# Patient Record
Sex: MALE | Race: WHITE | Employment: OTHER | URBAN - METROPOLITAN AREA
[De-identification: names, ages, dates, MRNs, and addresses within clinical notes are randomized per-mention and may not be internally consistent; named-entity substitution may affect disease eponyms.]

---

## 2020-08-19 ENCOUNTER — HOSPITAL ENCOUNTER (EMERGENCY)
Facility: MEDICAL CENTER | Age: 50
End: 2020-08-19
Attending: EMERGENCY MEDICINE
Payer: COMMERCIAL

## 2020-08-19 VITALS
HEIGHT: 70 IN | WEIGHT: 214.73 LBS | BODY MASS INDEX: 30.74 KG/M2 | TEMPERATURE: 97.2 F | HEART RATE: 88 BPM | OXYGEN SATURATION: 98 % | DIASTOLIC BLOOD PRESSURE: 88 MMHG | RESPIRATION RATE: 18 BRPM | SYSTOLIC BLOOD PRESSURE: 158 MMHG

## 2020-08-19 DIAGNOSIS — S61.213A LACERATION OF LEFT MIDDLE FINGER WITHOUT FOREIGN BODY WITHOUT DAMAGE TO NAIL, INITIAL ENCOUNTER: ICD-10-CM

## 2020-08-19 PROCEDURE — 700102 HCHG RX REV CODE 250 W/ 637 OVERRIDE(OP): Performed by: EMERGENCY MEDICINE

## 2020-08-19 PROCEDURE — 90715 TDAP VACCINE 7 YRS/> IM: CPT | Performed by: EMERGENCY MEDICINE

## 2020-08-19 PROCEDURE — 99283 EMERGENCY DEPT VISIT LOW MDM: CPT

## 2020-08-19 PROCEDURE — 700111 HCHG RX REV CODE 636 W/ 250 OVERRIDE (IP): Performed by: EMERGENCY MEDICINE

## 2020-08-19 PROCEDURE — 90471 IMMUNIZATION ADMIN: CPT

## 2020-08-19 PROCEDURE — A9270 NON-COVERED ITEM OR SERVICE: HCPCS | Performed by: EMERGENCY MEDICINE

## 2020-08-19 PROCEDURE — 700101 HCHG RX REV CODE 250: Performed by: EMERGENCY MEDICINE

## 2020-08-19 PROCEDURE — A6403 STERILE GAUZE>16 <= 48 SQ IN: HCPCS

## 2020-08-19 PROCEDURE — 304217 HCHG IRRIGATION SYSTEM

## 2020-08-19 PROCEDURE — 304999 HCHG REPAIR-SIMPLE/INTERMED LEVEL 1

## 2020-08-19 PROCEDURE — 303747 HCHG EXTRA SUTURE

## 2020-08-19 RX ORDER — HYDROCODONE BITARTRATE AND ACETAMINOPHEN 5; 325 MG/1; MG/1
1 TABLET ORAL EVERY 8 HOURS PRN
Qty: 15 TAB | Refills: 0 | Status: SHIPPED | OUTPATIENT
Start: 2020-08-19 | End: 2020-08-24

## 2020-08-19 RX ORDER — CEPHALEXIN 500 MG/1
500 CAPSULE ORAL 4 TIMES DAILY
Qty: 28 CAP | Refills: 0 | Status: SHIPPED | OUTPATIENT
Start: 2020-08-19 | End: 2021-03-09

## 2020-08-19 RX ORDER — LIDOCAINE HYDROCHLORIDE 20 MG/ML
20 INJECTION, SOLUTION INFILTRATION; PERINEURAL ONCE
Status: COMPLETED | OUTPATIENT
Start: 2020-08-19 | End: 2020-08-19

## 2020-08-19 RX ORDER — CEPHALEXIN 500 MG/1
500 CAPSULE ORAL ONCE
Status: COMPLETED | OUTPATIENT
Start: 2020-08-19 | End: 2020-08-19

## 2020-08-19 RX ADMIN — LIDOCAINE HYDROCHLORIDE 20 ML: 20 INJECTION, SOLUTION INFILTRATION; PERINEURAL at 13:04

## 2020-08-19 RX ADMIN — CLOSTRIDIUM TETANI TOXOID ANTIGEN (FORMALDEHYDE INACTIVATED), CORYNEBACTERIUM DIPHTHERIAE TOXOID ANTIGEN (FORMALDEHYDE INACTIVATED), BORDETELLA PERTUSSIS TOXOID ANTIGEN (GLUTARALDEHYDE INACTIVATED), BORDETELLA PERTUSSIS FILAMENTOUS HEMAGGLUTININ ANTIGEN (FORMALDEHYDE INACTIVATED), BORDETELLA PERTUSSIS PERTACTIN ANTIGEN, AND BORDETELLA PERTUSSIS FIMBRIAE 2/3 ANTIGEN 0.5 ML: 5; 2; 2.5; 5; 3; 5 INJECTION, SUSPENSION INTRAMUSCULAR at 13:22

## 2020-08-19 RX ADMIN — CEPHALEXIN 500 MG: 500 CAPSULE ORAL at 13:28

## 2020-08-19 ASSESSMENT — LIFESTYLE VARIABLES
DO YOU DRINK ALCOHOL: NO
DOES PATIENT WANT TO STOP DRINKING: NO

## 2020-08-19 NOTE — ED TRIAGE NOTES
"Chief Complaint   Patient presents with   • Hand Laceration     Patient cut the top of his right hand on metal. Bleeding controlled with guaze.      /100   Pulse 92   Temp 36.2 °C (97.2 °F) (Temporal)   Resp 16   Ht 1.778 m (5' 10\")   Wt 97.4 kg (214 lb 11.7 oz)   SpO2 95%   BMI 30.81 kg/m²   Pt placed back in lobby, educated on triage process, and told to inform staff of any change in condition.     "

## 2020-08-19 NOTE — ED NOTES
Understanding of DC paperwork. Medication administered per MAR. Pt's lac dressed. Bleeding controlled.

## 2020-08-20 NOTE — ED PROVIDER NOTES
ED Provider Note    CHIEF COMPLAINT   Chief Complaint   Patient presents with   • Hand Laceration     Patient cut the top of his right hand on metal. Bleeding controlled with guaze.        HPI   Jose Null is a 50 y.o. male who presents emergency room today with laceration on his right hand.  Patient states he was trying to drill into a piece of metal accidentally caught his right hand between the second and third digit causing laceration to the third digit on the top of his hand.  This occurred just prior to being seen emergency room he states he is able to move everything does not have a foreign body sensation he has some localized pain to the area no numbness or tingling.  Patient is right-hand dominant.    REVIEW OF SYSTEMS   See HPI for further details. All other systems are negative.     PAST MEDICAL HISTORY   No past medical history on file.    FAMILY HISTORY  History reviewed. No pertinent family history.    SOCIAL HISTORY  Social History     Socioeconomic History   • Marital status:      Spouse name: Not on file   • Number of children: Not on file   • Years of education: Not on file   • Highest education level: Not on file   Occupational History   • Not on file   Social Needs   • Financial resource strain: Not on file   • Food insecurity     Worry: Not on file     Inability: Not on file   • Transportation needs     Medical: Not on file     Non-medical: Not on file   Tobacco Use   • Smoking status: Never Smoker   • Smokeless tobacco: Never Used   Substance and Sexual Activity   • Alcohol use: Yes     Comment: occ   • Drug use: No   • Sexual activity: Not on file   Lifestyle   • Physical activity     Days per week: Not on file     Minutes per session: Not on file   • Stress: Not on file   Relationships   • Social connections     Talks on phone: Not on file     Gets together: Not on file     Attends Anglican service: Not on file     Active member of club or organization: Not on file      "Attends meetings of clubs or organizations: Not on file     Relationship status: Not on file   • Intimate partner violence     Fear of current or ex partner: Not on file     Emotionally abused: Not on file     Physically abused: Not on file     Forced sexual activity: Not on file   Other Topics Concern   • Not on file   Social History Narrative   • Not on file        SURGICAL HISTORY  No past surgical history on file.    CURRENT MEDICATIONS   Home Medications     Reviewed by Renuka Jones R.N. (Registered Nurse) on 08/19/20 at 1217  Med List Status: Complete   Medication Last Dose Status   erythromycin 5 MG/GM Ointment  Active   hydrocodone-acetaminophen (NORCO) 5-325 MG Tab per tablet not taking Active                ALLERGIES   No Known Allergies    PHYSICAL EXAM  VITAL SIGNS: /88   Pulse 88   Temp 36.2 °C (97.2 °F) (Temporal)   Resp 18   Ht 1.778 m (5' 10\")   Wt 97.4 kg (214 lb 11.7 oz)   SpO2 98%   BMI 30.81 kg/m²       Constitutional: Well developed, Well nourished, No acute distress, Non-toxic appearance.   Cardiovascular: Normal heart rate, Normal rhythm, No murmurs, No rubs, No gallops.   Thorax & Lungs: Normal breath sounds, No respiratory distress, No wheezing, No chest tenderness.   Skin: 5 cm laceration across the dorsal aspect of his right middle finger near the PIP joint extends to the MCP and web of the skin depth is to subcutaneous there is tendon exposure with attendant is intact through range of motion intact no foreign body noted.  Two-point discrimination distally cap refills less than 3 seconds.  Extremities: Intact distal pulses, No edema, No tenderness, No cyanosis, No clubbing.       COURSE & MEDICAL DECISION MAKING  Pertinent Labs & Imaging studies reviewed. (See chart for details) discussed signs and symptoms of infection which include but not limited to redness, swelling, discharge or fever return promptly to the emergency room.  Tetanus was made up-to-date patient was " placed on antibiotics given first dose here in the emergency room Motrin for pain also placed on Norco for breakthrough pain he understands is opiate can be addictive try alternatives for such as above Motrin no drive or use of alcohol and [place]  reviewed patient will follow-up with his primary care physician sutures out in 10 days.  He was given bacitracin to use also daily and and clean area daily.          PROCEDURE NOTE; repair of 5 cm laceration by ER physician; patient injected and locally anesthetized using Xylocaine 2% without epinephrine total amount of 3 cc.  Area was irrigated with 500 cc of sterile saline solution by ER tech.  Under sterile conditions and using sterile technique sutures applied of a 4×0 nylon tied for total of 8 interrupted.  There is good closure of the wound patient tolerated procedure well.  Bacitracin dressing applied.  Prior to closure the tendon was explored there is no foreign body and no injury to the tendon noted.    FINAL IMPRESSION  1.  Acute 5 cm laceration right hand  2.   3.      Electronically signed by: Herber Dean D.O., 8/19/2020 8:46 PM

## 2020-12-03 ENCOUNTER — HOSPITAL ENCOUNTER (OUTPATIENT)
Facility: MEDICAL CENTER | Age: 50
End: 2020-12-03
Attending: PHYSICIAN ASSISTANT

## 2020-12-03 ENCOUNTER — OFFICE VISIT (OUTPATIENT)
Dept: URGENT CARE | Facility: PHYSICIAN GROUP | Age: 50
End: 2020-12-03

## 2020-12-03 VITALS
HEIGHT: 73 IN | RESPIRATION RATE: 16 BRPM | BODY MASS INDEX: 27.04 KG/M2 | TEMPERATURE: 96.6 F | SYSTOLIC BLOOD PRESSURE: 128 MMHG | WEIGHT: 204 LBS | OXYGEN SATURATION: 98 % | HEART RATE: 80 BPM | DIASTOLIC BLOOD PRESSURE: 68 MMHG

## 2020-12-03 DIAGNOSIS — R81 GLUCOSURIA: ICD-10-CM

## 2020-12-03 DIAGNOSIS — R73.9 HYPERGLYCEMIA: ICD-10-CM

## 2020-12-03 DIAGNOSIS — R30.0 DYSURIA: ICD-10-CM

## 2020-12-03 DIAGNOSIS — E11.9 TYPE 2 DIABETES MELLITUS WITHOUT COMPLICATION, WITHOUT LONG-TERM CURRENT USE OF INSULIN (HCC): ICD-10-CM

## 2020-12-03 LAB
APPEARANCE UR: CLEAR
BILIRUB UR STRIP-MCNC: NORMAL MG/DL
COLOR UR AUTO: YELLOW
GLUCOSE BLD-MCNC: 437 MG/DL (ref 70–100)
GLUCOSE UR STRIP.AUTO-MCNC: 500 MG/DL
HBA1C MFR BLD: 12 % (ref 0–5.6)
INT CON NEG: ABNORMAL
INT CON POS: ABNORMAL
KETONES UR STRIP.AUTO-MCNC: 15 MG/DL
LEUKOCYTE ESTERASE UR QL STRIP.AUTO: NORMAL
NITRITE UR QL STRIP.AUTO: NORMAL
PH UR STRIP.AUTO: 5 [PH] (ref 5–8)
PROT UR QL STRIP: NORMAL MG/DL
RBC UR QL AUTO: NORMAL
SP GR UR STRIP.AUTO: 1.01
UROBILINOGEN UR STRIP-MCNC: 0.2 MG/DL

## 2020-12-03 PROCEDURE — 82962 GLUCOSE BLOOD TEST: CPT | Performed by: PHYSICIAN ASSISTANT

## 2020-12-03 PROCEDURE — 87077 CULTURE AEROBIC IDENTIFY: CPT

## 2020-12-03 PROCEDURE — 81002 URINALYSIS NONAUTO W/O SCOPE: CPT | Performed by: PHYSICIAN ASSISTANT

## 2020-12-03 PROCEDURE — 99203 OFFICE O/P NEW LOW 30 MIN: CPT | Performed by: PHYSICIAN ASSISTANT

## 2020-12-03 PROCEDURE — 83036 HEMOGLOBIN GLYCOSYLATED A1C: CPT | Performed by: PHYSICIAN ASSISTANT

## 2020-12-03 PROCEDURE — 87086 URINE CULTURE/COLONY COUNT: CPT

## 2020-12-03 PROCEDURE — 87186 SC STD MICRODIL/AGAR DIL: CPT

## 2020-12-03 ASSESSMENT — ENCOUNTER SYMPTOMS
LOSS OF CONSCIOUSNESS: 0
POLYDIPSIA: 1
BLURRED VISION: 0
NAUSEA: 0
DIZZINESS: 0
FEVER: 0
CHILLS: 0
FLANK PAIN: 0
HEADACHES: 0
DOUBLE VISION: 0
TINGLING: 0

## 2020-12-04 DIAGNOSIS — R30.0 DYSURIA: ICD-10-CM

## 2020-12-04 NOTE — PROGRESS NOTES
"Subjective:   Jose Null is a 50 y.o. male who presents for UTI (x1 week, frequent urination, urine is sticky and has oder. feel thirsty all the time, throat feels dry  )        This is a new problem.  Patient states that he is noticed increased urinary frequency lately and cloudy urine.  Symptoms have been present for approximately 1 week.  Additionally his throat feels dry \"thirsty all the time.\"  He is concerned about a possible UTI.  He denies flank pain, back pain, nausea, vomiting, fevers, chills, cognitive changes, malaise, fatigue.  Denies personal and family history of diabetes.  He is not established with PCP due to lack of medical insurance.  States that he has no other chronic medical problems.  No aggravating or alleviating factors.  Has never had symptoms of this nature previously.    Review of Systems   Constitutional: Negative for chills and fever.   Eyes: Negative for blurred vision and double vision.   Gastrointestinal: Negative for nausea.   Genitourinary: Positive for frequency and urgency. Negative for dysuria, flank pain and hematuria.   Neurological: Negative for dizziness, tingling, loss of consciousness and headaches.   Endo/Heme/Allergies: Positive for polydipsia.       PMH:  has a past medical history of Diabetes ([facility]).  MEDS:   Current Outpatient Medications:   •  metFORMIN (GLUCOPHAGE) 850 MG Tab, Take 1 Tab by mouth 2 times a day, with meals. Take one pill at dinner time for 7 days, then take a pill with breakfast and dinner, daily., Disp: 60 Tab, Rfl: 0  •  cephALEXin (KEFLEX) 500 MG Cap, Take 1 Cap by mouth 4 times a day. (Patient not taking: Reported on 12/3/2020), Disp: 28 Cap, Rfl: 0  •  erythromycin 5 MG/GM Ointment, Apply small amount twice a day to right eye for 3 days (Patient not taking: Reported on 12/3/2020), Disp: 1 Tube, Rfl: 0  •  hydrocodone-acetaminophen (NORCO) 5-325 MG Tab per tablet, Take 1-2 Tabs by mouth every 6 hours as needed. (Patient not taking: " "Reported on 12/3/2020), Disp: 20 Tab, Rfl: 0  ALLERGIES: No Known Allergies  SURGHX: History reviewed. No pertinent surgical history.  SOCHX:  reports that he has never smoked. He has never used smokeless tobacco. He reports current alcohol use. He reports that he does not use drugs.  FH: Family history was reviewed, no pertinent findings to report   Objective:   /68   Pulse 80   Temp 35.9 °C (96.6 °F) (Temporal)   Resp 16   Ht 1.854 m (6' 1\")   Wt 92.5 kg (204 lb)   SpO2 98%   BMI 26.91 kg/m²   Physical Exam  Vitals signs reviewed.   Constitutional:       General: He is not in acute distress.     Appearance: Normal appearance. He is well-developed. He is not toxic-appearing.   HENT:      Head: Normocephalic and atraumatic.      Right Ear: External ear normal.      Left Ear: External ear normal.      Nose: Nose normal.   Eyes:      General: Gaze aligned appropriately.   Neck:      Musculoskeletal: Neck supple.   Cardiovascular:      Rate and Rhythm: Normal rate and regular rhythm.      Heart sounds: Normal heart sounds, S1 normal and S2 normal.   Pulmonary:      Effort: Pulmonary effort is normal. No respiratory distress.      Breath sounds: Normal breath sounds. No stridor. No decreased breath sounds, wheezing, rhonchi or rales.   Skin:     General: Skin is warm and dry.      Capillary Refill: Capillary refill takes less than 2 seconds.   Neurological:      Mental Status: He is alert and oriented to person, place, and time.      Comments: CN2-12 grossly intact   Psychiatric:         Speech: Speech normal.         Behavior: Behavior normal.           Results for orders placed or performed in visit on 12/03/20   POCT Urinalysis   Result Value Ref Range    POC Color Yellow Negative    POC Appearance Clear Negative    POC Leukocyte Esterase Neg Negative    POC Nitrites Neg Negative    POC Urobiligen 0.2 Negative (0.2) mg/dL    POC Protein Neg Negative mg/dL    POC Urine PH 5.0 5.0 - 8.0    POC Blood " Trace-lysed Negative    POC Specific Gravity 1.015 <1.005 - >1.030    POC Ketones 15 Negative mg/dL    POC Bilirubin Neg Negative mg/dL    POC Glucose 500 Negative mg/dL   POCT Glucose   Result Value Ref Range    Glucose - Accu-Ck 437 (A) 70 - 100 mg/dL   POCT  A1C   Result Value Ref Range    Glycohemoglobin 12 (A) 0.0 - 5.6 %    Internal Control Negative Valid     Internal Control Positive Valid        Assessment/Plan:   1. Type 2 diabetes mellitus without complication, without long-term current use of insulin (HCC)  - metFORMIN (GLUCOPHAGE) 850 MG Tab; Take 1 Tab by mouth 2 times a day, with meals. Take one pill at dinner time for 7 days, then take a pill with breakfast and dinner, daily.  Dispense: 60 Tab; Refill: 0  - REFERRAL TO ENDOCRINOLOGY  - REFERRAL TO DIABETIC EDUCATION    2. Hyperglycemia  - metFORMIN (GLUCOPHAGE) 850 MG Tab; Take 1 Tab by mouth 2 times a day, with meals. Take one pill at dinner time for 7 days, then take a pill with breakfast and dinner, daily.  Dispense: 60 Tab; Refill: 0  - REFERRAL TO ENDOCRINOLOGY  - REFERRAL TO DIABETIC EDUCATION    3. Dysuria  - POCT Urinalysis  - Urine Culture; Future  - REFERRAL TO ENDOCRINOLOGY  - REFERRAL TO DIABETIC EDUCATION    4. Glucosuria  - POCT Glucose  - POCT  A1C  - REFERRAL TO ENDOCRINOLOGY  - REFERRAL TO DIABETIC EDUCATION    Patient has glucosuria and significantly elevated serum glucose.  He is having polydipsia and polyuria, but history, physical exam, UA do not suggest HHS.  Patient advised that he has diabetes-etiology and disease course discussed..  Patient started on Metformin.  Dietary changes and increasing physical exercises briefly discussed.  Patient urgently referred to endocrinology for further evaluation and management.  Red flag signs and symptoms discussed with patient at length.  He was instructed to go to the ER if symptoms progress or red flag symptoms develop.  He verbalized good understanding of these.  Patient also referred to  diabetes education for further management.    Differential diagnosis, natural history, supportive care, and indications for immediate follow-up discussed.

## 2020-12-04 NOTE — PATIENT INSTRUCTIONS
Diabetes, preguntas más frecuentes  (Diabetes, Frequently Asked Questions)  ¿QUÉ ES LA DIABETES?  La mayor parte de los alimentos que consumimos se transforman en glucosa (azúcar), que es utilizada por el cuerpo para generar energía. El páncreas, un órgano que se encuentra cerca del estómago, produce damaso hormona llamada insulina para facilitar el transporte de la glucosa hacia el interior de las células del organismo. Cuando se sufre de diabetes, el organismo no produce suficiente insulina o no puede utilizarla adecuadamente. [place] hace que el azúcar se acumule en la jairon.  ¿CUÁLES SON LOS SÍNTOMAS DE LA DIABETES?  · Necesidad frecuente de orinar   · Sed excesiva.   · Pérdida de peso sin causa aparente.   · Hambre excesiva.   · Visión borrosa.   · Hormigueo o adormecimiento de las alexandrea y los pies.   · Cansancio extremo la mayor parte del tiempo.   · Piel seca y que pica.   · Úlceras que tardan mucho en curarse.   · Infección por hongos.   ¿CUÁLES SON LOS TIPOS DE DIABETES?  Diabetes tipo 1  · Aproximadamente un 10% de las personas afectadas sufren jolynn tipo de diabetes.   · Suele aparecer antes de los 30 años.   · Suele ocurrir en personas con peso normal.   Diabetes tipo 2  · Aproximadamente un 90% de las personas afectadas sufren jolynn tipo de diabetes.   · Suele aparecer después de los 40 años.   · Suele ocurrir en personas con sobrepeso.   · Más probabilidades si tiene:   · Antecedentes familiares de diabetes.   · Historial de diabetes belinda el embarazo (diabetes gestacional).   · Hipertensión arterial.   · Colesterol alto y triglicéridos.   Diabetes gestacional  · Se presenta en el 4% de los embarazos.   · Por lo general desaparece damaso vez que ha nacido el bebé.   · Suele ocurrir en mujeres con:   · Antecedentes familiares de diabetes.   · Diabetes gestacional previa.   · Obesidad.   · Mayores de 25 años.   ¿QUÉ ES LA PRE-DIABETES?  Pre-diabetes significa que busby nivel de glucosa en jairon es mayor que lo  normal, tamanna no lo suficiente nilson para diagnosticar diabetes. También significa que usted está en riesgo de padecer diabetes tipo 2 y enfermedad cardíaca. Si se le diagnostica pre-diabetes, deberá controlarse la glucosa en jairon nuevamente dentro de 1 o 2 años.  ¿CÓMO SE REALIZA EL TRATAMIENTO PARA LA DIABETES?   El tratamiento está dirigido a mantener los niveles de glucosa (azúcar) de la jairon cerca de los valores normales en todo momento. En el tratamiento de la diabetes, es muy importante el entrenamiento para el autocontrol de la enfermedad. Según el tipo de diabetes que tenga, busby tratamiento incluirá rashad o más de las indicaciones siguientes :  · Control de la glucosa en jairon.   · Planificación de los alimentos.   · Práctica de ejercicios.   · Medicamentos por vía oral (píldoras) o insulina.   ¿PUEDE PREVENIRSE LA DIABETES?  En la diabetes de tipo 1, la prevención es más difícil, debido a que no se conoce cuáles pueden ser los disparadores  En la diabetes tipo 2, la prevención es más fácil, si realiza cambios en el estilo de daphney:  · Mantener un peso corporal adecuado.   · [place] edel.   · La práctica de ejercicios.   ¿EXISTE DAMASO FORD PARA LA DIABETES?  No hay ford para la diabetes. Se investiga de manera continua en búsqueda de damaso ford, y se majano realizado progresos. Nicole trastorno puede tratarse y controlarse. Las personas con diabetes pueden controlarla y llevar damaso daphney normal y activa.  ¿DEBERÍA HACERME UN CONTROL PARA LA DIABETES?  Si tiene más de 45 años, debe realizarse un control de diabetes. Deberá volver a realizarlo cada 3 años. Si tiene 45 años o más y tiene sobrepeso es muy recomendable que se realice un control con mayor frecuencia. Si tiene menos de 45 años, sobrepeso, y tiene rashad o más factores de riesgo, deberá considerar:  · Antecedentes familiares de diabetes.   · Estilo de daphney sedentario   · Hipertensión arterial.   ¿EXISTEN OTRAS CHAUDHRY DE INFORMACIÓN DE LA DIABETES?   Las siguientes  organizaciones pueden resultarle útiles en busby búsqueda de información sobre la diabetes:  National Diabetes Education Program (Programa Nacional de Educación sobre la Diabetes)  Internet: http://www.ndep.nih.gov/resources  American Association of Diabetes Educators (Asociación Norteamericana de Educadores para la Diabetes)  Internet: http://www.aadenet.org  Juvenile Diabetes Foundation International (Fundación Internacional para la Diabetes Juvenil)  Internet: http://www.jdf.org  Document Released: 12/18/2006 Document Revised: 03/11/2013  ExitCare® Patient Information ©2013 [employer].

## 2020-12-06 ENCOUNTER — TELEPHONE (OUTPATIENT)
Dept: URGENT CARE | Facility: CLINIC | Age: 50
End: 2020-12-06

## 2020-12-06 DIAGNOSIS — N30.00 ACUTE CYSTITIS WITHOUT HEMATURIA: ICD-10-CM

## 2020-12-06 LAB
BACTERIA UR CULT: ABNORMAL
BACTERIA UR CULT: ABNORMAL
SIGNIFICANT IND 70042: ABNORMAL
SITE SITE: ABNORMAL
SOURCE SOURCE: ABNORMAL

## 2020-12-06 RX ORDER — SULFAMETHOXAZOLE AND TRIMETHOPRIM 800; 160 MG/1; MG/1
1 TABLET ORAL 2 TIMES DAILY
Qty: 28 TAB | Refills: 0 | Status: SHIPPED | OUTPATIENT
Start: 2020-12-06 | End: 2020-12-20

## 2020-12-06 NOTE — TELEPHONE ENCOUNTER
Attempted to call pt with urine culture results. No answer and voicemail us not set up. Will continue to try to call pt. Medication sent to pharmacy.

## 2020-12-09 ENCOUNTER — TELEPHONE (OUTPATIENT)
Dept: MEDICAL GROUP | Facility: PHYSICIAN GROUP | Age: 50
End: 2020-12-09

## 2020-12-09 NOTE — TELEPHONE ENCOUNTER
Pt daughter-I-law called converned that referral to Endocrinology first appt is in May.  Can you refer them somewhere else or have other suggestion.  He has picked up medications.     Can You call the daughter in law who is helping to take care of him.    Thanks     Jemma

## 2021-01-04 ENCOUNTER — TELEPHONE (OUTPATIENT)
Dept: URGENT CARE | Facility: PHYSICIAN GROUP | Age: 51
End: 2021-01-04

## 2021-01-04 NOTE — TELEPHONE ENCOUNTER
Pt's daughter in law called to see if her dad can be given a new referral as the only appointment they could get from the 12/3 referral is not until May. Please call her back as soon as possible.

## 2021-01-07 NOTE — TELEPHONE ENCOUNTER
"Barney Ravi,    I attempted to contact this patient on multiple prior occasions and attempted to contact him on 1/7/21 twice. I placed the referral as \"Urgent.\"  I cannot do anything else to expedite this appointment, but I am happy to refill his metformin in the interim. Additionally, I recommend that he establish with primary ASAP, as they might also help to manage his diabetes. Please let me know if pt or family calls again. It would be helpful to get an alternate phone number, as voicemail is not set up on the pt's listed phone number.    Thank you!  JH"

## 2021-01-21 ENCOUNTER — OFFICE VISIT (OUTPATIENT)
Dept: URGENT CARE | Facility: PHYSICIAN GROUP | Age: 51
End: 2021-01-21

## 2021-01-21 VITALS
DIASTOLIC BLOOD PRESSURE: 72 MMHG | TEMPERATURE: 97.4 F | OXYGEN SATURATION: 98 % | SYSTOLIC BLOOD PRESSURE: 118 MMHG | HEIGHT: 73 IN | BODY MASS INDEX: 27.04 KG/M2 | RESPIRATION RATE: 16 BRPM | HEART RATE: 61 BPM | WEIGHT: 204 LBS

## 2021-01-21 DIAGNOSIS — R30.0 DYSURIA: ICD-10-CM

## 2021-01-21 PROCEDURE — 99213 OFFICE O/P EST LOW 20 MIN: CPT | Performed by: NURSE PRACTITIONER

## 2021-01-21 PROCEDURE — 81002 URINALYSIS NONAUTO W/O SCOPE: CPT | Performed by: NURSE PRACTITIONER

## 2021-01-21 ASSESSMENT — ENCOUNTER SYMPTOMS
CHILLS: 0
FEVER: 0

## 2021-01-22 ENCOUNTER — HOSPITAL ENCOUNTER (OUTPATIENT)
Facility: MEDICAL CENTER | Age: 51
End: 2021-01-22
Attending: NURSE PRACTITIONER

## 2021-01-22 DIAGNOSIS — R30.0 DYSURIA: ICD-10-CM

## 2021-01-22 LAB
APPEARANCE UR: CLEAR
BILIRUB UR STRIP-MCNC: NORMAL MG/DL
COLOR UR AUTO: YELLOW
GLUCOSE UR STRIP.AUTO-MCNC: NORMAL MG/DL
KETONES UR STRIP.AUTO-MCNC: NORMAL MG/DL
LEUKOCYTE ESTERASE UR QL STRIP.AUTO: NORMAL
NITRITE UR QL STRIP.AUTO: NORMAL
PH UR STRIP.AUTO: 5.5 [PH] (ref 5–8)
PROT UR QL STRIP: NORMAL MG/DL
RBC UR QL AUTO: NORMAL
SP GR UR STRIP.AUTO: 1.02
UROBILINOGEN UR STRIP-MCNC: 0.2 MG/DL

## 2021-01-22 PROCEDURE — 87491 CHLMYD TRACH DNA AMP PROBE: CPT

## 2021-01-22 PROCEDURE — 87591 N.GONORRHOEAE DNA AMP PROB: CPT

## 2021-01-22 PROCEDURE — 87186 SC STD MICRODIL/AGAR DIL: CPT

## 2021-01-22 PROCEDURE — 87077 CULTURE AEROBIC IDENTIFY: CPT

## 2021-01-22 PROCEDURE — 87086 URINE CULTURE/COLONY COUNT: CPT

## 2021-01-22 NOTE — PROGRESS NOTES
Subjective:      Jose Null is a 51 y.o. male who presents with UTI (x1 week, pt wants to check on uti and diabetes )    Past Medical History:   Diagnosis Date   • Diabetes (HCC)      Social History     Socioeconomic History   • Marital status:      Spouse name: Not on file   • Number of children: Not on file   • Years of education: Not on file   • Highest education level: Not on file   Occupational History   • Not on file   Social Needs   • Financial resource strain: Not on file   • Food insecurity     Worry: Not on file     Inability: Not on file   • Transportation needs     Medical: Not on file     Non-medical: Not on file   Tobacco Use   • Smoking status: Never Smoker   • Smokeless tobacco: Never Used   Substance and Sexual Activity   • Alcohol use: Yes     Comment: occ beer   • Drug use: No   • Sexual activity: Not on file   Lifestyle   • Physical activity     Days per week: Not on file     Minutes per session: Not on file   • Stress: Not on file   Relationships   • Social connections     Talks on phone: Not on file     Gets together: Not on file     Attends Buddhist service: Not on file     Active member of club or organization: Not on file     Attends meetings of clubs or organizations: Not on file     Relationship status: Not on file   • Intimate partner violence     Fear of current or ex partner: Not on file     Emotionally abused: Not on file     Physically abused: Not on file     Forced sexual activity: Not on file   Other Topics Concern   • Not on file   Social History Narrative   • Not on file     History reviewed. No pertinent family history.    Allergies: Patient has no known allergies.    Patient c/o mild dysuira over the last week.  States he has mild burning with urination.  No fever, aches, or chills.  No frequency or urgency.    At patient's last visit in December, he was diagnosed with type 2 diabetes and started on Metformin.  His A1c was 12% at that time and blood glucose  "was over 300.  Patient was given referral to endocrinology, but states he is not able to get in until May.  He currently does not have a primary care physician.    Patient states he feels he did not tolerate the metformin well and stopped taking it after 4 doses.  He states right now that subjectively he feels very well.  He denies excessive thirst or urination.        UTI  This is a new problem. The current episode started in the past 7 days. The problem occurs constantly. The problem has been unchanged. Pertinent negatives include no chills or fever. Nothing aggravates the symptoms. He has tried nothing for the symptoms.       Review of Systems   Constitutional: Positive for malaise/fatigue. Negative for chills and fever.   Genitourinary: Positive for frequency and urgency.   All other systems reviewed and are negative.         Objective:     /72   Pulse 61   Temp 36.3 °C (97.4 °F) (Temporal)   Resp 16   Ht 1.854 m (6' 1\")   Wt 92.5 kg (204 lb)   SpO2 98%   BMI 26.91 kg/m²      Physical Exam  Vitals signs reviewed.   Constitutional:       Appearance: Normal appearance.   Cardiovascular:      Rate and Rhythm: Normal rate and regular rhythm.      Pulses: Normal pulses.      Heart sounds: Normal heart sounds.   Pulmonary:      Effort: Pulmonary effort is normal.      Breath sounds: Normal breath sounds.   Musculoskeletal: Normal range of motion.   Skin:     General: Skin is warm and dry.   Neurological:      Mental Status: He is alert and oriented to person, place, and time.   Psychiatric:         Mood and Affect: Mood normal.         Behavior: Behavior normal.         Thought Content: Thought content normal.         Judgment: Judgment normal.       UA: negative  blood, negative leukocytes, negative nitrates, negative glucose    Point-of-care glucose: 75 mg/dL          Assessment/Plan:        1. Dysuria    Advised to keep the appointment with endocrinology  Referred to family practice and patient will " stop at the  on his way out and make an appointment  Urine culture  Urine for gonorrhea and chlamydia  Follow up in urgent care otherwise for persistent symptoms.

## 2021-01-23 LAB
C TRACH DNA SPEC QL NAA+PROBE: NEGATIVE
N GONORRHOEA DNA SPEC QL NAA+PROBE: NEGATIVE
SPECIMEN SOURCE: NORMAL

## 2021-01-24 ENCOUNTER — TELEPHONE (OUTPATIENT)
Dept: URGENT CARE | Facility: CLINIC | Age: 51
End: 2021-01-24

## 2021-01-24 DIAGNOSIS — N30.90 CYSTITIS: ICD-10-CM

## 2021-01-24 RX ORDER — NITROFURANTOIN 25; 75 MG/1; MG/1
100 CAPSULE ORAL 2 TIMES DAILY
Qty: 14 CAP | Refills: 0 | Status: SHIPPED | OUTPATIENT
Start: 2021-01-24 | End: 2021-01-31

## 2021-01-25 ENCOUNTER — TELEPHONE (OUTPATIENT)
Dept: URGENT CARE | Facility: PHYSICIAN GROUP | Age: 51
End: 2021-01-25

## 2021-01-25 NOTE — TELEPHONE ENCOUNTER
Attempted to call patient again; no answer and no voicemail set up. Patient is not registered for a [email or web address] account.  Letter sent.

## 2021-01-25 NOTE — PROGRESS NOTES
Patient's urine culture was positive for Enterococcus faecalis.  Prescription for Macrobid sent to patient's pharmacy; culture and sensitivity indicates sensitivity to Macrobid.

## 2021-03-05 ENCOUNTER — TELEPHONE (OUTPATIENT)
Dept: MEDICAL GROUP | Facility: PHYSICIAN GROUP | Age: 51
End: 2021-03-05

## 2021-03-05 NOTE — TELEPHONE ENCOUNTER
Future Appointments       Provider Department Center    3/9/2021 2:00 PM ARTUR Palencia [facility]    5/25/2021 3:00 PM Rickey Vargas M.D. [facility] - Endocrinology [facility]        NEW PATIENT VISIT PRE-VISIT PLANNING    1.  [employer] Patient is checked in Patient Demographics?Yes    2.  Immunizations were updated in Epic using Reconcile Outside Information activity? Yes         3.  Is this appointment scheduled as a Hospital Follow-Up? No    4.  Patient is due for the following Health Maintenance Topics:   Health Maintenance Due   Topic Date Due   • IMM PNEUMOCOCCAL VACCINE: 0-64 Years (1 of 1 - PPSV23) Never done   • COLONOSCOPY  Never done   • IMM ZOSTER VACCINES (1 of 2) Never done   • IMM INFLUENZA (1) Never done     5.  Reviewed/Updated the following with patient:       •   Preferred Pharmacy? Yes       •   Preferred Lab? Yes       •   Preferred Communication? Yes       •   Allergies? Yes       •   Medications? YES. Was Abstract Encounter opened and chart updated? YES       •   Social History? Yes       •   Family History (document living status of immediate family members and if + hx of  cancer, diabetes, hypertension, hyperlipidemia, heart attack, stroke) Yes    6.  Updated Care Team?       •   DME Company (gait device, O2, CPAP, etc.) NO       •   Other Specialists (eye doctor, derm, GYN, cardiology, endo, etc): N\A    7.  AHA (Puls8) form printed for Provider? N/A   Patient advised to come in 15 minutes prior to appointment

## 2021-03-09 ENCOUNTER — OFFICE VISIT (OUTPATIENT)
Dept: MEDICAL GROUP | Facility: PHYSICIAN GROUP | Age: 51
End: 2021-03-09

## 2021-03-09 VITALS
HEIGHT: 73 IN | WEIGHT: 191 LBS | DIASTOLIC BLOOD PRESSURE: 70 MMHG | BODY MASS INDEX: 25.31 KG/M2 | OXYGEN SATURATION: 98 % | HEART RATE: 82 BPM | SYSTOLIC BLOOD PRESSURE: 114 MMHG | RESPIRATION RATE: 16 BRPM | TEMPERATURE: 97.8 F

## 2021-03-09 DIAGNOSIS — Z76.89 ESTABLISHING CARE WITH NEW DOCTOR, ENCOUNTER FOR: ICD-10-CM

## 2021-03-09 DIAGNOSIS — E11.65 TYPE 2 DIABETES MELLITUS WITH HYPERGLYCEMIA, WITHOUT LONG-TERM CURRENT USE OF INSULIN (HCC): ICD-10-CM

## 2021-03-09 PROBLEM — E11.9 TYPE 2 DIABETES MELLITUS (HCC): Status: ACTIVE | Noted: 2021-03-09

## 2021-03-09 LAB
HBA1C MFR BLD: 6 % (ref 0–5.6)
INT CON NEG: ABNORMAL
INT CON POS: ABNORMAL

## 2021-03-09 PROCEDURE — 83036 HEMOGLOBIN GLYCOSYLATED A1C: CPT | Performed by: NURSE PRACTITIONER

## 2021-03-09 PROCEDURE — 99214 OFFICE O/P EST MOD 30 MIN: CPT | Performed by: NURSE PRACTITIONER

## 2021-03-09 ASSESSMENT — PATIENT HEALTH QUESTIONNAIRE - PHQ9: CLINICAL INTERPRETATION OF PHQ2 SCORE: 0
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
## 2021-03-09 NOTE — ASSESSMENT & PLAN NOTE
"New to examiner.  Patient was initially seen at [place] urgent [facility] on 12/3/20 for frequent urination, thirst, and urinary odor.  Patient was found to have hyperglycemia, glucosuria, and a urinary tract infection.  His A1c was 12%.  He was started on Metformin 850 mg twice a day and a referral to endocrinology and diabetic education was placed.  Patient returned to urgent care on 1/21/21 for burning with urination.  He had stopped taking his Metformin because he was feeling better and it was causing changes to his vision after 4 doses.  Since then the patient's wife has drastically changed his diet.  He has now started drinking daily fresh green juices, limiting beer and alcohol intake, and reducing consumption of tortillas and other carbohydrates.  His hemoglobin A1c in office today was 6%.  He reports feeling better.  He denies symptoms of polyphagia, polydipsia, or polyuria.  He has an appointment with Dr. Bonilla endocrinology on May 25, 2021.  His wife was concerned about his weight loss, however he reports that he feels well.    Vitals 12/3/2020 1/21/2021 3/9/2021   WEIGHT 204 204 191   HEIGHT 6' 1\" 6' 1\" 6' 1\"   BMI 26.91 kg/m2 26.91 kg/m2 25.2 kg/m2     "

## 2021-03-09 NOTE — LETTER
[facility]  ADA PalenciaRDEIDRE  [address]  [facility] [address]  Fax: [phone]   Authorization for Release/Disclosure of   Protected Health Information   Name: JOSE NULL : 1970 SSN: [id number]   Address: [address] [place] [address] Phone:    [phone] (home)    I authorize the entity listed below to release/disclose the PHI below to:   [facility]/ADA PalenciaRDEIDRE and ARTUR Palencia   Provider or Entity Name:  [facility]   Address   City, State, Zip   Phone:      Fax:     Reason for request: continuity of care   Information to be released:    [  ] LAST COLONOSCOPY,  including any PATH REPORT and follow-up  [  ] LAST FIT/COLOGUARD RESULT [  ] LAST DEXA  [  ] LAST MAMMOGRAM  [  ] LAST PAP  [  ] LAST LABS [  ] RETINA EXAM REPORT  [  ] IMMUNIZATION RECORDS  [X] Release all info      [  ] Check here and initial the line next to each item to release ALL health information INCLUDING  _____ Care and treatment for drug and / or alcohol abuse  _____ HIV testing, infection status, or AIDS  _____ Genetic Testing    DATES OF SERVICE OR TIME PERIOD TO BE DISCLOSED: _____________  I understand and acknowledge that:  * This Authorization may be revoked at any time by you in writing, except if your health information has already been used or disclosed.  * Your health information that will be used or disclosed as a result of you signing this authorization could be re-disclosed by the recipient. If this occurs, your re-disclosed health information may no longer be protected by State or Federal laws.  * You may refuse to sign this Authorization. Your refusal will not affect your ability to obtain treatment.  * This Authorization becomes effective upon signing and will  on (date) __________.      If no date is indicated, this Authorization will  one (1) year from the signature date.    Name: Jose Null    Signature:   Date:          3/9/2021       PLEASE FAX REQUESTED RECORDS BACK TO: [phone]

## 2021-03-09 NOTE — LETTER
[facility]  ADA PalenciaRDarylNDaryl  [address]  [facility] [address]  Fax: [phone]   Authorization for Release/Disclosure of   Protected Health Information   Name: JOSE NULL : 1970 SSN: [id number]   Address: [address] [place] [address] Phone:    [phone] (home)    I authorize the entity listed below to release/disclose the PHI below to:   [facility]/TARA Palencia.RDEIDRE and ARTUR Palencia   Provider or Entity Name:  [facility]   Address   City, State, Zip   Phone:      Fax:     Reason for request: continuity of care   Information to be released:    [  ] LAST COLONOSCOPY,  including any PATH REPORT and follow-up  [  ] LAST FIT/COLOGUARD RESULT [  ] LAST DEXA  [  ] LAST MAMMOGRAM  [  ] LAST PAP  [  ] LAST LABS [  ] RETINA EXAM REPORT  [  ] IMMUNIZATION RECORDS  [X] Release all info      [  ] Check here and initial the line next to each item to release ALL health information INCLUDING  _____ Care and treatment for drug and / or alcohol abuse  _____ HIV testing, infection status, or AIDS  _____ Genetic Testing    DATES OF SERVICE OR TIME PERIOD TO BE DISCLOSED: _____________  I understand and acknowledge that:  * This Authorization may be revoked at any time by you in writing, except if your health information has already been used or disclosed.  * Your health information that will be used or disclosed as a result of you signing this authorization could be re-disclosed by the recipient. If this occurs, your re-disclosed health information may no longer be protected by State or Federal laws.  * You may refuse to sign this Authorization. Your refusal will not affect your ability to obtain treatment.  * This Authorization becomes effective upon signing and will  on (date) __________.      If no date is indicated, this Authorization will  one (1) year from the signature date.    Name: Jose Null    Signature:   Date:          3/9/2021       PLEASE FAX REQUESTED RECORDS BACK TO: [phone]

## 2021-03-10 ENCOUNTER — HOSPITAL ENCOUNTER (OUTPATIENT)
Dept: LAB | Facility: MEDICAL CENTER | Age: 51
End: 2021-03-10
Attending: NURSE PRACTITIONER

## 2021-03-10 DIAGNOSIS — E11.65 TYPE 2 DIABETES MELLITUS WITH HYPERGLYCEMIA, WITHOUT LONG-TERM CURRENT USE OF INSULIN (HCC): ICD-10-CM

## 2021-03-10 LAB
ANION GAP SERPL CALC-SCNC: 9 MMOL/L (ref 7–16)
BUN SERPL-MCNC: 14 MG/DL (ref 8–22)
CALCIUM SERPL-MCNC: 9.6 MG/DL (ref 8.5–10.5)
CHLORIDE SERPL-SCNC: 106 MMOL/L (ref 96–112)
CHOLEST SERPL-MCNC: 204 MG/DL (ref 100–199)
CO2 SERPL-SCNC: 25 MMOL/L (ref 20–33)
CREAT SERPL-MCNC: 1.02 MG/DL (ref 0.5–1.4)
CREAT UR-MCNC: 104.93 MG/DL
FASTING STATUS PATIENT QL REPORTED: NORMAL
GLUCOSE SERPL-MCNC: 100 MG/DL (ref 65–99)
HDLC SERPL-MCNC: 56 MG/DL
LDLC SERPL CALC-MCNC: 127 MG/DL
MICROALBUMIN UR-MCNC: <1.2 MG/DL
MICROALBUMIN/CREAT UR: NORMAL MG/G (ref 0–30)
POTASSIUM SERPL-SCNC: 4.3 MMOL/L (ref 3.6–5.5)
SODIUM SERPL-SCNC: 140 MMOL/L (ref 135–145)
TRIGL SERPL-MCNC: 105 MG/DL (ref 0–149)

## 2021-03-10 PROCEDURE — 80048 BASIC METABOLIC PNL TOTAL CA: CPT

## 2021-03-10 PROCEDURE — 82570 ASSAY OF URINE CREATININE: CPT

## 2021-03-10 PROCEDURE — 80061 LIPID PANEL: CPT

## 2021-03-10 PROCEDURE — 36415 COLL VENOUS BLD VENIPUNCTURE: CPT

## 2021-03-10 PROCEDURE — 82043 UR ALBUMIN QUANTITATIVE: CPT

## 2021-03-11 NOTE — RESULT ENCOUNTER NOTE
"Please call the patient and let him know that his urine test, which checks for albumin and protein in the urine with diabetes was normal.    His BMP, eGFR which checks electrolytes, fasting blood sugar, and kidney function was stable, fasting blood sugar was only slightly elevated at 100.    His lipid profile showed slightly elevated total cholesterol at 204, normal triglycerides at 105, normal HDL (happy cholesterol) at 56, and elevated LDL (lousy cholesterol) at 127.  He can take an over-the-counter omega 3 fatty acids supplement, increase fiber in his diet, and decrease saturated fats (red meats, full fat dairy's) and trans fat (may be listed on labels as \"partially hydrogenated vegetable oil\") to help bring these values back to normal range. Additionally, losing weight, participating in aerobic exercise at least 150 minutes per week, avoidance of sugar, alcohol, and fatty/fried food will help to bring these levels back to normal. If he takes plain omega 3 fatty acids, he should take anywhere from 1-4 grams per day.    Please encourage him to keep his appointment with endocrine on 5/25/2021.    Thank you,    WOLF Palencia.  "

## 2021-04-26 ENCOUNTER — OFFICE VISIT (OUTPATIENT)
Dept: MEDICAL GROUP | Facility: PHYSICIAN GROUP | Age: 51
End: 2021-04-26

## 2021-04-26 ENCOUNTER — HOSPITAL ENCOUNTER (OUTPATIENT)
Facility: MEDICAL CENTER | Age: 51
End: 2021-04-26
Attending: NURSE PRACTITIONER

## 2021-04-26 VITALS
WEIGHT: 186 LBS | OXYGEN SATURATION: 98 % | HEIGHT: 73 IN | DIASTOLIC BLOOD PRESSURE: 86 MMHG | TEMPERATURE: 98.5 F | HEART RATE: 71 BPM | SYSTOLIC BLOOD PRESSURE: 136 MMHG | BODY MASS INDEX: 24.65 KG/M2

## 2021-04-26 DIAGNOSIS — R30.0 DYSURIA: ICD-10-CM

## 2021-04-26 LAB
APPEARANCE UR: CLEAR
BILIRUB UR STRIP-MCNC: NORMAL MG/DL
COLOR UR AUTO: YELLOW
GLUCOSE UR STRIP.AUTO-MCNC: NORMAL MG/DL
KETONES UR STRIP.AUTO-MCNC: NORMAL MG/DL
LEUKOCYTE ESTERASE UR QL STRIP.AUTO: NORMAL
NITRITE UR QL STRIP.AUTO: NORMAL
PH UR STRIP.AUTO: 6 [PH] (ref 5–8)
PROT UR QL STRIP: NORMAL MG/DL
RBC UR QL AUTO: NORMAL
SP GR UR STRIP.AUTO: 1.02
UROBILINOGEN UR STRIP-MCNC: NORMAL MG/DL

## 2021-04-26 PROCEDURE — 87086 URINE CULTURE/COLONY COUNT: CPT

## 2021-04-26 PROCEDURE — 99214 OFFICE O/P EST MOD 30 MIN: CPT | Performed by: NURSE PRACTITIONER

## 2021-04-26 PROCEDURE — 81002 URINALYSIS NONAUTO W/O SCOPE: CPT | Performed by: NURSE PRACTITIONER

## 2021-04-26 RX ORDER — PHENAZOPYRIDINE HYDROCHLORIDE 200 MG/1
200 TABLET, FILM COATED ORAL 3 TIMES DAILY PRN
Qty: 12 TABLET | Refills: 0 | Status: SHIPPED | OUTPATIENT
Start: 2021-04-26 | End: 2021-07-07
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
## 2021-04-29 LAB
BACTERIA UR CULT: NORMAL
SIGNIFICANT IND 70042: NORMAL
SITE SITE: NORMAL
SOURCE SOURCE: NORMAL

## 2021-04-29 NOTE — RESULT ENCOUNTER NOTE
Please call the patient and let him know that his urine culture did not show bacterial growth after 48 hours. I hope that his symptoms have improved with the pyridium.    Thank you,    WOLF Palencia.

## 2021-05-25 ENCOUNTER — OFFICE VISIT (OUTPATIENT)
Dept: ENDOCRINOLOGY | Facility: MEDICAL CENTER | Age: 51
End: 2021-05-25
Attending: INTERNAL MEDICINE

## 2021-05-25 VITALS
WEIGHT: 189 LBS | OXYGEN SATURATION: 98 % | DIASTOLIC BLOOD PRESSURE: 72 MMHG | HEART RATE: 91 BPM | BODY MASS INDEX: 25.05 KG/M2 | HEIGHT: 73 IN | SYSTOLIC BLOOD PRESSURE: 138 MMHG | RESPIRATION RATE: 14 BRPM

## 2021-05-25 DIAGNOSIS — E78.5 DYSLIPIDEMIA: ICD-10-CM

## 2021-05-25 DIAGNOSIS — E11.9 CONTROLLED TYPE 2 DIABETES MELLITUS WITHOUT COMPLICATION, WITHOUT LONG-TERM CURRENT USE OF INSULIN (HCC): ICD-10-CM

## 2021-05-25 DIAGNOSIS — Z79.84 LONG TERM (CURRENT) USE OF ORAL HYPOGLYCEMIC DRUGS: ICD-10-CM

## 2021-05-25 PROCEDURE — 99204 OFFICE O/P NEW MOD 45 MIN: CPT | Performed by: INTERNAL MEDICINE

## 2021-05-25 PROCEDURE — 99211 OFF/OP EST MAY X REQ PHY/QHP: CPT | Performed by: INTERNAL MEDICINE

## 2021-05-25 RX ORDER — ATORVASTATIN CALCIUM 20 MG/1
20 TABLET, FILM COATED ORAL DAILY
Qty: 90 TABLET | Refills: 3 | Status: SHIPPED | OUTPATIENT
Start: 2021-05-25 | End: 2022-05-31

## 2021-05-25 RX ORDER — LINAGLIPTIN 5 MG/1
5 TABLET, FILM COATED ORAL DAILY
Qty: 30 TABLET | Refills: 6 | Status: SHIPPED | OUTPATIENT
Start: 2021-05-25 | End: 2021-05-25

## 2021-05-25 RX ORDER — PIOGLITAZONEHYDROCHLORIDE 15 MG/1
15 TABLET ORAL DAILY
Qty: 90 TABLET | Refills: 3 | Status: SHIPPED | OUTPATIENT
Start: 2021-05-25 | End: 2021-06-24

## 2021-05-25 RX ORDER — DAPAGLIFLOZIN 10 MG/1
1 TABLET, FILM COATED ORAL DAILY
Qty: 30 TABLET | Refills: 6 | Status: SHIPPED | OUTPATIENT
Start: 2021-05-25 | End: 2021-05-25

## 2021-05-25 NOTE — PROGRESS NOTES
"Chief Complaint:  Consult requested by ARTUR Palencia for initial evaluation of Type 2 Diabetes Mellitus    HPI:   Jose Null is a 51 y.o. male with Type 2 Diabetes Mellitus diagnosed in 12/2020.  He denies hospitalizations for DKA in the past.    His a1c improved from 12% to 6.0% on 3/9/2021    He was on metformin but he stopped it because according to him it made his vision worse.  Even though I tried to explain this to him that the vision changes was from diabetes and fluctuation in his sugars and not metformin.  He is refusing metfromin at this time.    He does not monitor his blood glucose     He denies hypoglycemic episodes   He  denies hypoglycemic unawareness. He denies episodes of severe hypoglycemia requiring third party assistance.  He  is not wearing a medical alert bracelet or necklace.  He does not a glucagon emergency kit.    He denies attending diabetes education classes.  Diet: \"healthy\" diet  in general.    Diabetes Complications   He  denies history of retinopathy.  He denies laser eye surgery. Last eye exam: He is overdue for an eye exam  He denies history of peripheral sensory neuropathy.  He denies numbness, tingling in both feet.  He denies history of foot sores.   He denies history of kidney damage.  He is not on ACE inhibitor or ARB.   He denies history of coronary artery disease.  He  denies history of stroke and denies TIA.  He denies history of PAD.  He reports history of hyperlipidemia.      ROS:     CONS:     No fever, no chills, no weight loss, no fatigue   EYES:      No diplopia, no blurry vision, no redness of eyes, no swelling of eyelids   ENT:    No hearing loss, No ear pain, No sore throat, no dysphagia, no neck swelling   CV:     No chest pain, no palpitations, no claudication, no orthopnea, no PND   PULM:    No SOB, no cough, no hemoptysis, no wheezing    GI:   No nausea, no vomiting, no diarrhea, no constipation, no bloody stools   :  Passing urine " well, no dysuria, no hematuria   ENDO:   No polyuria, no polydipsia, no heat intolerance, no cold intolerance   NEURO: No headaches, no dizziness, no convulsions, no tremors   MUSC:  No joint swellings, no arthralgias, no myalgias, no weakness   SKIN:   No rash, no ulcers, no dry skin   PSYCH:   No depression, no anxiety, no difficulty sleeping       Past Medical History:  Patient Active Problem List    Diagnosis Date Noted   • Dyslipidemia 2021   • Dysuria 2021   • Controlled type 2 diabetes mellitus without complication, without long-term current use of insulin ([facility]) 2021       Past Surgical History:  History reviewed. No pertinent surgical history.     Allergies:  Patient has no known allergies.     Current Medications:    Current Outpatient Medications:   •  linagliptin (TRADJENTA) 5 MG Tab tablet, Take 1 tablet by mouth every day., Disp: 30 tablet, Rfl: 6  •  Dapagliflozin Propanediol (FARXIGA) 10 MG Tab, Take 1 tablet  by mouth every day., Disp: 30 tablet, Rfl: 6  •  atorvastatin (LIPITOR) 20 MG Tab, Take 1 tablet by mouth every day., Disp: 90 tablet, Rfl: 3  •  phenazopyridine (PYRIDIUM) 200 MG Tab, Take 1 tablet by mouth 3 times a day as needed for Moderate Pain. (Patient not taking: Reported on 2021), Disp: 12 tablet, Rfl: 0    Social History:  Social History     Socioeconomic History   • Marital status:      Spouse name: Rubia   • Number of children: 1   • Years of education: Not on file   • Highest education level: Not on file   Occupational History     Comment: Construction, painting   Tobacco Use   • Smoking status: Former Smoker     Packs/day: 0.25     Years: 3.00     Pack years: 0.75     Types: Cigarettes     Start date: 3/9/2002     Quit date: 3/9/2005     Years since quittin.2   • Smokeless tobacco: Never Used   • Tobacco comment: smoked for about 3 or 4 years few a day 5-6   Vaping Use   • Vaping Use: Never used   Substance and Sexual Activity   • Alcohol use:  "Yes     Alcohol/week: 5.4 oz     Types: 9 Shots of liquor per week     Comment: 3 shots 3 times a week   • Drug use: No   • Sexual activity: Yes     Partners: Female     Comment: wife is on contraception   Other Topics Concern   • Not on file   Social History Narrative   • Not on file     Social Determinants of Health     Financial Resource Strain:    • Difficulty of Paying Living Expenses:    Food Insecurity:    • Worried About Running Out of Food in the Last Year:    • Ran Out of Food in the Last Year:    Transportation Needs:    • Lack of Transportation (Medical):    • Lack of Transportation (Non-Medical):    Physical Activity:    • Days of Exercise per Week:    • Minutes of Exercise per Session:    Stress:    • Feeling of Stress :    Social Connections:    • Frequency of Communication with Friends and Family:    • Frequency of Social Gatherings with Friends and Family:    • Attends [employer] Services:    • Active Member of Clubs or Organizations:    • Attends Club or Organization Meetings:    • Marital Status:    Intimate Partner Violence:    • Fear of Current or Ex-Partner:    • Emotionally Abused:    • Physically Abused:    • Sexually Abused:         Family History:   Family History   Problem Relation Age of Onset   • No Known Problems Mother    • No Known Problems Father    • No Known Problems Brother    • No Known Problems Maternal Uncle    • No Known Problems Paternal Aunt    • No Known Problems Paternal Uncle    • No Known Problems Maternal Grandmother    • No Known Problems Maternal Grandfather    • No Known Problems Paternal Grandmother    • No Known Problems Paternal Grandfather    • No Known Problems Brother    • No Known Problems Brother    • No Known Problems Paternal Uncle    • No Known Problems Paternal Uncle        PHYSICAL EXAM:   Vital signs: /72 (BP Location: Right arm, Patient Position: Sitting, BP Cuff Size: Adult)   Pulse 91   Resp 14   Ht 1.854 m (6' 1\")   Wt 85.7 kg (189 lb)   " SpO2 98%   BMI 24.94 kg/m²   GENERAL: Well-developed, well-nourished  in no apparent distress.   EYE: No ocular and eyelid asymmetry, Anicteric sclerae,  PERRL, No exophthalmos or lidlag  HENT: Hearing grossly intact, Normocephalic, atraumatic. Pink, moist mucous membranes, No exudate  NECK: Supple. Trachea midline. thyroid is normal in size without nodules or tenderness  CARDIOVASCULAR: Regular rate and rhythm. No murmurs, rubs, or gallops.   LUNGS: Clear to auscultation bilaterally   ABDOMEN: Soft, nontender with positive bowel sounds.   EXTREMITIES: No clubbing, cyanosis, or edema.   NEUROLOGICAL: Cranial nerves II-XII are grossly intact   Symmetric reflexes at the patella no proximal muscle weakness, No visible tremor with both outstretched hands  LYMPH: No cervical, supraclavicular,  adenopathy palpated.   SKIN: No rashes, lesions. Turgor is normal.  FOOT: Normal sensation to monofilament testing, normal pulses, no ulcers.  Normal Vibration quantitative sensation test.    Labs:  Lab Results   Component Value Date/Time    HBA1C 6.0 (A) 03/09/2021 1432       No results found for: WBC, RBC, HEMOGLOBIN, MCV, MCH, MCHC, RDW, MPV    Lab Results   Component Value Date/Time    SODIUM 140 03/10/2021 09:24 AM    POTASSIUM 4.3 03/10/2021 09:24 AM    CHLORIDE 106 03/10/2021 09:24 AM    CO2 25 03/10/2021 09:24 AM    ANION 9.0 03/10/2021 09:24 AM    GLUCOSE 100 (H) 03/10/2021 09:24 AM    BUN 14 03/10/2021 09:24 AM    CREATININE 1.02 03/10/2021 09:24 AM    CALCIUM 9.6 03/10/2021 09:24 AM       Lab Results   Component Value Date/Time    CHOLSTRLTOT 204 (H) 03/10/2021 0924    TRIGLYCERIDE 105 03/10/2021 0924    HDL 56 03/10/2021 0924     (H) 03/10/2021 0924       Lab Results   Component Value Date/Time    MALBCRT see below 03/10/2021 09:24 AM    MICROALBUR <1.2 03/10/2021 09:24 AM        No results found for: TSHULTRASEN  No results found for: FREEDIR  No results found for: FREET3  No results found for:  THYSTIMIG      ASSESSMENT/PLAN:     1. Controlled type 2 diabetes mellitus without complication, without long-term current use of insulin (HCC)  Previously uncontrolled diabetes now controlled  Reviewed pathogenesis of type 2 diabetes and the importance of taking medications and keeping blood sugar well controlled to avoid vascular complications  Patient is glucose control is complicated by lack of insurance  Try to explain to patient that Metformin does not cause blindness and his blurry vision was most likely secondary to fluctuations in his sugars  Because he is refusing Metformin I want him to take Actos 15 mg daily for now  I am also starting him on atorvastatin  Because of lack of insurance coverage I want him to do labs at [facility]  If his next A1c is good I want him to follow-up with his primary care physician because it is expensive for him to follow-up with a specialist especially with his lack of insurance  He will follow up with our diabetes nurse in 3 months  Also discussed with patient that he should have an eye exam I am referring him to ophthalmology    2. Dyslipidemia  Unstable recommend starting atorvastatin  Repeat fasting lipids in 3 months    3. Long term (current) use of oral hypoglycemic drugs  Patient is on oral agents for type 2 diabetes management      Return in about 3 months (around 8/25/2021).       This patient during there office visit was started on new medication.  Side effects of new medications were discussed with the patient today in the office. The patient was supplied paperwork on this new medication.    Thank you kindly for allowing me to participate in the diabetes care plan for this patient.    Rickey Vargas MD, [facility]  05/25/21    CC:   ARTUR Palencia

## 2021-07-06 ENCOUNTER — TELEPHONE (OUTPATIENT)
Dept: MEDICAL GROUP | Facility: PHYSICIAN GROUP | Age: 51
End: 2021-07-06

## 2021-07-06 NOTE — TELEPHONE ENCOUNTER
Phone Number Called: [phone] (home)     Call outcome: Spoke to patient regarding message below.    Message: No Show Policy was reviewed    Jose Null No Showed a established apt on 6/29/21 with ARTUR Palencia.     YES  -1 Jose Null was reminded of appointment.     Stated he forgot apt as he is busy. Was able to reschedule for 7/7/21.

## 2021-07-07 ENCOUNTER — OFFICE VISIT (OUTPATIENT)
Dept: MEDICAL GROUP | Facility: PHYSICIAN GROUP | Age: 51
End: 2021-07-07

## 2021-07-07 VITALS
DIASTOLIC BLOOD PRESSURE: 66 MMHG | WEIGHT: 189 LBS | SYSTOLIC BLOOD PRESSURE: 108 MMHG | TEMPERATURE: 98.6 F | HEIGHT: 73 IN | HEART RATE: 69 BPM | BODY MASS INDEX: 25.05 KG/M2 | OXYGEN SATURATION: 96 %

## 2021-07-07 DIAGNOSIS — Z12.11 SCREENING FOR COLORECTAL CANCER: ICD-10-CM

## 2021-07-07 DIAGNOSIS — E11.9 CONTROLLED TYPE 2 DIABETES MELLITUS WITHOUT COMPLICATION, WITHOUT LONG-TERM CURRENT USE OF INSULIN (HCC): ICD-10-CM

## 2021-07-07 DIAGNOSIS — R30.0 DYSURIA: ICD-10-CM

## 2021-07-07 DIAGNOSIS — E78.5 DYSLIPIDEMIA: ICD-10-CM

## 2021-07-07 DIAGNOSIS — Z12.12 SCREENING FOR COLORECTAL CANCER: ICD-10-CM

## 2021-07-07 PROCEDURE — 99214 OFFICE O/P EST MOD 30 MIN: CPT | Performed by: NURSE PRACTITIONER

## 2021-07-07 PROCEDURE — 92250 FUNDUS PHOTOGRAPHY W/I&R: CPT | Mod: TC | Performed by: NURSE PRACTITIONER

## 2021-07-07 NOTE — ASSESSMENT & PLAN NOTE
New to examiner, chronic problem for the patient.  Continues atorvastatin 20 mg daily, denies side effects of medication.  Due for annual labs in March 2022.
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
## 2021-07-07 NOTE — ASSESSMENT & PLAN NOTE
Chronic, ongoing and stable.  Continues to follow with endocrinology.  Not currently on medication for this issue.  Lab orders given to patient by endocrinology.  Due for updated retinal screening.

## 2021-07-14 LAB — RETINAL SCREEN: NEGATIVE

## 2021-07-25 ENCOUNTER — HOSPITAL ENCOUNTER (EMERGENCY)
Facility: MEDICAL CENTER | Age: 51
End: 2021-07-26
Attending: EMERGENCY MEDICINE

## 2021-07-25 DIAGNOSIS — R04.0 EPISTAXIS: ICD-10-CM

## 2021-07-25 DIAGNOSIS — T14.8XXA BRUISING: ICD-10-CM

## 2021-07-25 PROCEDURE — 82077 ASSAY SPEC XCP UR&BREATH IA: CPT

## 2021-07-25 PROCEDURE — 85025 COMPLETE CBC W/AUTO DIFF WBC: CPT

## 2021-07-25 PROCEDURE — 83690 ASSAY OF LIPASE: CPT

## 2021-07-25 PROCEDURE — 80053 COMPREHEN METABOLIC PANEL: CPT

## 2021-07-25 PROCEDURE — 99283 EMERGENCY DEPT VISIT LOW MDM: CPT

## 2021-07-26 VITALS
DIASTOLIC BLOOD PRESSURE: 69 MMHG | HEIGHT: 73 IN | TEMPERATURE: 98.8 F | HEART RATE: 87 BPM | SYSTOLIC BLOOD PRESSURE: 127 MMHG | BODY MASS INDEX: 25.18 KG/M2 | OXYGEN SATURATION: 92 % | RESPIRATION RATE: 16 BRPM | WEIGHT: 190 LBS

## 2021-07-26 LAB
ALBUMIN SERPL BCP-MCNC: 4.8 G/DL (ref 3.2–4.9)
ALBUMIN/GLOB SERPL: 1.4 G/DL
ALP SERPL-CCNC: 117 U/L (ref 30–99)
ALT SERPL-CCNC: 40 U/L (ref 2–50)
ANION GAP SERPL CALC-SCNC: 17 MMOL/L (ref 7–16)
APTT PPP: 28.3 SEC (ref 24.7–36)
AST SERPL-CCNC: 42 U/L (ref 12–45)
BASOPHILS # BLD AUTO: 0.4 % (ref 0–1.8)
BASOPHILS # BLD: 0.03 K/UL (ref 0–0.12)
BILIRUB SERPL-MCNC: 0.5 MG/DL (ref 0.1–1.5)
BUN SERPL-MCNC: 14 MG/DL (ref 8–22)
CALCIUM SERPL-MCNC: 9 MG/DL (ref 8.5–10.5)
CHLORIDE SERPL-SCNC: 103 MMOL/L (ref 96–112)
CO2 SERPL-SCNC: 28 MMOL/L (ref 20–33)
CREAT SERPL-MCNC: 1.12 MG/DL (ref 0.5–1.4)
EOSINOPHIL # BLD AUTO: 0.08 K/UL (ref 0–0.51)
EOSINOPHIL NFR BLD: 1 % (ref 0–6.9)
ERYTHROCYTE [DISTWIDTH] IN BLOOD BY AUTOMATED COUNT: 48.7 FL (ref 35.9–50)
ETHANOL BLD-MCNC: 310.8 MG/DL (ref 0–10)
GLOBULIN SER CALC-MCNC: 3.5 G/DL (ref 1.9–3.5)
GLUCOSE SERPL-MCNC: 120 MG/DL (ref 65–99)
HCT VFR BLD AUTO: 54.8 % (ref 42–52)
HGB BLD-MCNC: 18.5 G/DL (ref 14–18)
IMM GRANULOCYTES # BLD AUTO: 0.02 K/UL (ref 0–0.11)
IMM GRANULOCYTES NFR BLD AUTO: 0.2 % (ref 0–0.9)
INR PPP: 1.03 (ref 0.87–1.13)
LIPASE SERPL-CCNC: 66 U/L (ref 11–82)
LYMPHOCYTES # BLD AUTO: 3.55 K/UL (ref 1–4.8)
LYMPHOCYTES NFR BLD: 43.4 % (ref 22–41)
MCH RBC QN AUTO: 31.5 PG (ref 27–33)
MCHC RBC AUTO-ENTMCNC: 33.8 G/DL (ref 33.7–35.3)
MCV RBC AUTO: 93.4 FL (ref 81.4–97.8)
MONOCYTES # BLD AUTO: 0.66 K/UL (ref 0–0.85)
MONOCYTES NFR BLD AUTO: 8.1 % (ref 0–13.4)
NEUTROPHILS # BLD AUTO: 3.84 K/UL (ref 1.82–7.42)
NEUTROPHILS NFR BLD: 46.9 % (ref 44–72)
NRBC # BLD AUTO: 0 K/UL
NRBC BLD-RTO: 0 /100 WBC
PLATELET # BLD AUTO: 185 K/UL (ref 164–446)
PMV BLD AUTO: 10.9 FL (ref 9–12.9)
POTASSIUM SERPL-SCNC: 3.9 MMOL/L (ref 3.6–5.5)
PROT SERPL-MCNC: 8.3 G/DL (ref 6–8.2)
PROTHROMBIN TIME: 13.2 SEC (ref 12–14.6)
RBC # BLD AUTO: 5.87 M/UL (ref 4.7–6.1)
SODIUM SERPL-SCNC: 148 MMOL/L (ref 135–145)
WBC # BLD AUTO: 8.2 K/UL (ref 4.8–10.8)

## 2021-07-26 PROCEDURE — 85610 PROTHROMBIN TIME: CPT

## 2021-07-26 PROCEDURE — 85730 THROMBOPLASTIN TIME PARTIAL: CPT

## 2021-07-26 NOTE — ED TRIAGE NOTES
[facility]    Chief Complaint   Patient presents with   • Epistaxis     Pts family brought him in tonight for a nose bleed that started approx 2 hours ago. Bleeding controlled at this time.        PT ambulatory to triage with steady gait. PT roomed immediatly.
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
## 2021-07-26 NOTE — ED NOTES
Pt intoxicated so discharge instructions provided to patient's son and daughter in law who verbalized understanding of follow up appointments, contusions, and nosebleeds. Discharged from unit

## 2021-08-24 ENCOUNTER — NON-PROVIDER VISIT (OUTPATIENT)
Dept: ENDOCRINOLOGY | Facility: MEDICAL CENTER | Age: 51
End: 2021-08-24
Attending: INTERNAL MEDICINE

## 2021-08-24 VITALS — OXYGEN SATURATION: 97 % | HEIGHT: 73 IN | WEIGHT: 190 LBS | BODY MASS INDEX: 25.18 KG/M2

## 2021-08-24 DIAGNOSIS — E11.9 CONTROLLED TYPE 2 DIABETES MELLITUS WITHOUT COMPLICATION, WITHOUT LONG-TERM CURRENT USE OF INSULIN (HCC): ICD-10-CM

## 2021-08-24 LAB
HBA1C MFR BLD: 5.6 % (ref 0–5.6)
INT CON NEG: NORMAL
INT CON POS: NORMAL

## 2021-08-24 PROCEDURE — 99211 OFF/OP EST MAY X REQ PHY/QHP: CPT | Performed by: INTERNAL MEDICINE

## 2021-08-24 PROCEDURE — 83036 HEMOGLOBIN GLYCOSYLATED A1C: CPT

## 2021-08-24 ASSESSMENT — FIBROSIS 4 INDEX: FIB4 SCORE: 1.83

## 2021-08-24 NOTE — PROGRESS NOTES
RN-CDE Note    Subjective:   Endocrinology Clinic Progress Note  PCP: ARTUR Palencia    HPI:  Jose Null is a 51 y.o. old patient who is seen today for review of Type 2 Diabetes.  Recent changes in health: general health good  DM:   Last A1c:   Lab Results   Component Value Date/Time    HBA1C 5.6 08/24/2021 03:32 PM      Previous A1c was 6.0 on 3/9/21  A1C GOAL: < 7    Diabetes Medications:   No diabetes medications    Exercise: Walking and lifting weights.  Painting and remodeling work  Diet: protein, beans, eggs, and starch.  Patient's body mass index is 25.07 kg/m². Exercise and nutrition counseling were performed at this visit.    Glucose monitoring frequency: Not testing blood sugars    Hypoglycemic episodes: no  Last Retinal Exam: on file and up-to-date  Daily Foot Exam: Yes   Foot Exam:  Monofilament: done  Monofilament testing with a 10 gram force: sensation intact: intact bilaterally  Visual Inspection: Feet without maceration, ulcers, fissures.  Pedal pulses: intact bilaterally   Lab Results   Component Value Date/Time    MALBCRT see below 03/10/2021 09:24 AM    MICROALBUR <1.2 03/10/2021 09:24 AM        He  reports that he quit smoking about 16 years ago. His smoking use included cigarettes. He started smoking about 19 years ago. He has a 0.75 pack-year smoking history. He has never used smokeless tobacco.      Plan:     Discussed and educated on:   - All medications, side effects and compliance (discussed carefully)  - Annual eye examinations at Ophthalmology  - Home glucose monitoring emphasized  - Weight control and daily exercise    Recommended medication changes: Stopped drinking sugar and energy drinks. Follow up in 1 year.

## 2021-10-25 ENCOUNTER — HOSPITAL ENCOUNTER (OUTPATIENT)
Facility: MEDICAL CENTER | Age: 51
End: 2021-10-25
Attending: PHYSICIAN ASSISTANT

## 2021-10-25 ENCOUNTER — OFFICE VISIT (OUTPATIENT)
Dept: URGENT CARE | Facility: PHYSICIAN GROUP | Age: 51
End: 2021-10-25

## 2021-10-25 VITALS
OXYGEN SATURATION: 97 % | HEIGHT: 73 IN | BODY MASS INDEX: 25.18 KG/M2 | SYSTOLIC BLOOD PRESSURE: 146 MMHG | WEIGHT: 190 LBS | TEMPERATURE: 97.9 F | HEART RATE: 95 BPM | RESPIRATION RATE: 16 BRPM | DIASTOLIC BLOOD PRESSURE: 86 MMHG

## 2021-10-25 DIAGNOSIS — R30.0 DYSURIA: ICD-10-CM

## 2021-10-25 DIAGNOSIS — R39.89 SUSPECTED UTI: ICD-10-CM

## 2021-10-25 PROCEDURE — 99213 OFFICE O/P EST LOW 20 MIN: CPT | Performed by: PHYSICIAN ASSISTANT

## 2021-10-25 PROCEDURE — 87086 URINE CULTURE/COLONY COUNT: CPT

## 2021-10-25 RX ORDER — NITROFURANTOIN 25; 75 MG/1; MG/1
100 CAPSULE ORAL 2 TIMES DAILY
Qty: 14 CAPSULE | Refills: 0 | Status: SHIPPED | OUTPATIENT
Start: 2021-10-25 | End: 2021-11-01

## 2021-10-25 ASSESSMENT — ENCOUNTER SYMPTOMS
NAUSEA: 0
FLANK PAIN: 0
ABDOMINAL PAIN: 0

## 2021-10-25 ASSESSMENT — FIBROSIS 4 INDEX: FIB4 SCORE: 1.83
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
## 2021-10-26 DIAGNOSIS — R39.89 SUSPECTED UTI: ICD-10-CM

## 2021-10-26 DIAGNOSIS — R30.0 DYSURIA: ICD-10-CM

## 2021-10-28 LAB
BACTERIA UR CULT: NORMAL
SIGNIFICANT IND 70042: NORMAL
SITE SITE: NORMAL
SOURCE SOURCE: NORMAL

## 2021-12-29 ENCOUNTER — OFFICE VISIT (OUTPATIENT)
Dept: URGENT CARE | Facility: PHYSICIAN GROUP | Age: 51
End: 2021-12-29

## 2021-12-29 ENCOUNTER — HOSPITAL ENCOUNTER (OUTPATIENT)
Facility: MEDICAL CENTER | Age: 51
End: 2021-12-29
Attending: PHYSICIAN ASSISTANT

## 2021-12-29 VITALS
BODY MASS INDEX: 25.18 KG/M2 | DIASTOLIC BLOOD PRESSURE: 80 MMHG | HEART RATE: 84 BPM | TEMPERATURE: 98.7 F | HEIGHT: 73 IN | RESPIRATION RATE: 14 BRPM | WEIGHT: 190 LBS | OXYGEN SATURATION: 97 % | SYSTOLIC BLOOD PRESSURE: 128 MMHG

## 2021-12-29 DIAGNOSIS — R30.0 DYSURIA: ICD-10-CM

## 2021-12-29 LAB
APPEARANCE UR: CLEAR
BILIRUB UR STRIP-MCNC: NEGATIVE MG/DL
C TRACH DNA SPEC QL NAA+PROBE: NEGATIVE
COLOR UR AUTO: NORMAL
GLUCOSE UR STRIP.AUTO-MCNC: NEGATIVE MG/DL
KETONES UR STRIP.AUTO-MCNC: NEGATIVE MG/DL
LEUKOCYTE ESTERASE UR QL STRIP.AUTO: NEGATIVE
N GONORRHOEA DNA SPEC QL NAA+PROBE: NEGATIVE
NITRITE UR QL STRIP.AUTO: NEGATIVE
PH UR STRIP.AUTO: 5.5 [PH] (ref 5–8)
PROT UR QL STRIP: NEGATIVE MG/DL
RBC UR QL AUTO: NORMAL
SP GR UR STRIP.AUTO: 1.02
SPECIMEN SOURCE: NORMAL
UROBILINOGEN UR STRIP-MCNC: 0.2 MG/DL

## 2021-12-29 PROCEDURE — 87491 CHLMYD TRACH DNA AMP PROBE: CPT

## 2021-12-29 PROCEDURE — 87086 URINE CULTURE/COLONY COUNT: CPT

## 2021-12-29 PROCEDURE — 99213 OFFICE O/P EST LOW 20 MIN: CPT | Performed by: PHYSICIAN ASSISTANT

## 2021-12-29 PROCEDURE — 87591 N.GONORRHOEAE DNA AMP PROB: CPT

## 2021-12-29 PROCEDURE — 81002 URINALYSIS NONAUTO W/O SCOPE: CPT | Performed by: PHYSICIAN ASSISTANT

## 2021-12-29 ASSESSMENT — FIBROSIS 4 INDEX: FIB4 SCORE: 1.83

## 2021-12-29 NOTE — PROGRESS NOTES
Subjective     Jose Null is a 51 y.o. male who presents with Dysuria (x3days )    Medications:    • atorvastatin Tabs    Allergies: Patient has no known allergies.    Problem List: Jose Null does not have any pertinent problems on file.    Surgical History:  No past surgical history on file.    Past Social Hx: Jose Null  reports that he quit smoking about 16 years ago. His smoking use included cigarettes. He started smoking about 19 years ago. He has a 0.75 pack-year smoking history. He has never used smokeless tobacco. He reports current alcohol use of about 5.4 oz of alcohol per week. He reports that he does not use drugs.     Past Family Hx:  Jose Null family history includes No Known Problems in his brother, brother, brother, father, maternal grandfather, maternal grandmother, maternal uncle, mother, paternal aunt, paternal grandfather, paternal grandmother, paternal uncle, paternal uncle, and paternal uncle.     Problem list, medications, and allergies reviewed by myself today in Epic.          Patient presents with:  Dysuria: x3days , pt has history of UTI in past, though not frequently.  Pt denies new partner. Denies fever, chills, rash, lesions.     Dysuria   This is a new problem. Episode onset: 3 days. The problem occurs intermittently. The problem has been gradually worsening. The quality of the pain is described as burning. The pain is mild. There has been no fever. He is sexually active. There is no history of pyelonephritis. Associated symptoms include frequency. Pertinent negatives include no chills, discharge, flank pain, hematuria or urgency.       Review of Systems   Constitutional: Negative for chills and fever.   Genitourinary: Positive for dysuria and frequency. Negative for flank pain, hematuria and urgency.   Skin: Negative for rash.   All other systems reviewed and are negative.             Objective     /80   Pulse 84   Temp  "37.1 °C (98.7 °F) (Temporal)   Resp 14   Ht 1.854 m (6' 1\")   Wt 86.2 kg (190 lb)   SpO2 97%   BMI 25.07 kg/m²      Physical Exam  Vitals and nursing note reviewed.   Constitutional:       General: He is not in acute distress.     Appearance: Normal appearance. He is well-developed and normal weight. He is not toxic-appearing.   HENT:      Head: Normocephalic and atraumatic.      Nose: Nose normal.      Mouth/Throat:      Mouth: Mucous membranes are moist.   Eyes:      Extraocular Movements: Extraocular movements intact.      Conjunctiva/sclera: Conjunctivae normal.      Pupils: Pupils are equal, round, and reactive to light.   Cardiovascular:      Rate and Rhythm: Normal rate and regular rhythm.      Pulses: Normal pulses.      Heart sounds: Normal heart sounds.   Pulmonary:      Effort: Pulmonary effort is normal.      Breath sounds: Normal breath sounds.   Abdominal:      General: Bowel sounds are normal.      Palpations: Abdomen is soft.      Tenderness: There is no guarding or rebound.   Musculoskeletal:         General: Normal range of motion.      Cervical back: Normal range of motion and neck supple.   Skin:     General: Skin is warm and dry.      Capillary Refill: Capillary refill takes less than 2 seconds.   Neurological:      General: No focal deficit present.      Mental Status: He is alert and oriented to person, place, and time.      Gait: Gait normal.   Psychiatric:         Mood and Affect: Mood normal.         Behavior: Behavior is cooperative.                             Assessment & Plan             1. Dysuria  POCT Urinalysis    Chlamydia/GC PCR Urine Or Swab    URINE CULTURE(NEW)     Patient was evaluated in clinic today while wearing appropriate personal protective equipment.      Culture sent to lab, will call with any necessary treatment or treatment changes.  We will wait to prescribe any medications until culture results are known.  Pt agrees with this plan.     Increase fluids as " discussed.     PT should follow up with PCP in 1-2 days for re-evaluation if symptoms have not improved.      Discussed red flags and reasons to return to UC or ED.      Pt and/or family verbalized understanding of diagnosis and follow up instructions and was offered informational handout on diagnosis.  PT discharged.

## 2022-01-01 LAB
BACTERIA UR CULT: NORMAL
SIGNIFICANT IND 70042: NORMAL
SITE SITE: NORMAL
SOURCE SOURCE: NORMAL

## 2022-01-07 ASSESSMENT — ENCOUNTER SYMPTOMS
CHILLS: 0
FEVER: 0
FLANK PAIN: 0

## 2022-05-31 DIAGNOSIS — E78.5 DYSLIPIDEMIA: ICD-10-CM

## 2022-05-31 RX ORDER — ATORVASTATIN CALCIUM 20 MG/1
20 TABLET, FILM COATED ORAL DAILY
Qty: 90 TABLET | Refills: 3 | Status: SHIPPED | OUTPATIENT
Start: 2022-05-31 | End: 2023-05-29

## 2022-07-16 ENCOUNTER — HOSPITAL ENCOUNTER (EMERGENCY)
Facility: MEDICAL CENTER | Age: 52
End: 2022-07-16

## 2022-07-16 VITALS
RESPIRATION RATE: 14 BRPM | BODY MASS INDEX: 27.29 KG/M2 | OXYGEN SATURATION: 97 % | TEMPERATURE: 97.6 F | WEIGHT: 205.91 LBS | SYSTOLIC BLOOD PRESSURE: 155 MMHG | HEART RATE: 87 BPM | HEIGHT: 73 IN | DIASTOLIC BLOOD PRESSURE: 93 MMHG

## 2022-07-16 PROCEDURE — 302449 STATCHG TRIAGE ONLY (STATISTIC)

## 2022-07-16 ASSESSMENT — FIBROSIS 4 INDEX: FIB4 SCORE: 1.87

## 2022-07-16 NOTE — ED NOTES
Patient irritable with the financial assistance form.  Got up and left.  Tried to convince to stay.  Patient kept walking.

## 2022-07-16 NOTE — ED TRIAGE NOTES
"Chief Complaint   Patient presents with   • Eye Swelling     Pt states that one hour ago he was trying to blow his nose which resulted in his left eye becoming swollen. Pt with significant swelling to left eye. Pt states that when he presses on the swelling he feels air coming out.   Pt denies pain.    BP (!) 155/93   Pulse 87   Temp 36.4 °C (97.6 °F) (Temporal)   Resp 14   Ht 1.854 m (6' 1\")   Wt 93.4 kg (205 lb 14.6 oz)   SpO2 97%   BMI 27.17 kg/m²     "

## 2022-08-24 ENCOUNTER — HOSPITAL ENCOUNTER (OUTPATIENT)
Facility: MEDICAL CENTER | Age: 52
End: 2022-08-24
Attending: INTERNAL MEDICINE

## 2022-08-24 ENCOUNTER — OFFICE VISIT (OUTPATIENT)
Dept: ENDOCRINOLOGY | Facility: MEDICAL CENTER | Age: 52
End: 2022-08-24
Attending: INTERNAL MEDICINE

## 2022-08-24 VITALS
WEIGHT: 210 LBS | HEIGHT: 73 IN | HEART RATE: 91 BPM | SYSTOLIC BLOOD PRESSURE: 114 MMHG | BODY MASS INDEX: 27.83 KG/M2 | OXYGEN SATURATION: 99 % | DIASTOLIC BLOOD PRESSURE: 76 MMHG

## 2022-08-24 DIAGNOSIS — Z79.84 LONG TERM (CURRENT) USE OF ORAL HYPOGLYCEMIC DRUGS: ICD-10-CM

## 2022-08-24 DIAGNOSIS — E11.9 CONTROLLED TYPE 2 DIABETES MELLITUS WITHOUT COMPLICATION, WITHOUT LONG-TERM CURRENT USE OF INSULIN (HCC): ICD-10-CM

## 2022-08-24 DIAGNOSIS — E78.5 DYSLIPIDEMIA: ICD-10-CM

## 2022-08-24 LAB
HBA1C MFR BLD: 6.1 % (ref 0–5.6)
INT CON NEG: ABNORMAL
INT CON POS: ABNORMAL

## 2022-08-24 PROCEDURE — 83036 HEMOGLOBIN GLYCOSYLATED A1C: CPT | Performed by: INTERNAL MEDICINE

## 2022-08-24 PROCEDURE — 82043 UR ALBUMIN QUANTITATIVE: CPT

## 2022-08-24 PROCEDURE — 99214 OFFICE O/P EST MOD 30 MIN: CPT | Performed by: INTERNAL MEDICINE

## 2022-08-24 PROCEDURE — 92250 FUNDUS PHOTOGRAPHY W/I&R: CPT | Performed by: INTERNAL MEDICINE

## 2022-08-24 PROCEDURE — 99212 OFFICE O/P EST SF 10 MIN: CPT | Performed by: INTERNAL MEDICINE

## 2022-08-24 PROCEDURE — 82570 ASSAY OF URINE CREATININE: CPT

## 2022-08-24 ASSESSMENT — FIBROSIS 4 INDEX: FIB4 SCORE: 1.87

## 2022-08-24 ASSESSMENT — PATIENT HEALTH QUESTIONNAIRE - PHQ9: CLINICAL INTERPRETATION OF PHQ2 SCORE: 0

## 2022-08-24 NOTE — PROGRESS NOTES
CHIEF COMPLAINT: Patient is here for follow up of Type 2 Diabetes Mellitus    HPI:     Jose Null is a 52 y.o. male with Type 2 Diabetes Mellitus here for follow up.    Labs from 8/24/2022 HbA1c is 6.1%    Last seen as a consult last year  He has on insurance and works contractually  A1c used to be  10% but he got it under control initially with meds then with diet and exercise      He used to be on Metformin but is now diet controlled    He is active and exercises regularly  Denies any acute symptoms      He has hyperlipidemia and is on atorvastatin   But we dont have updated labs  Due to his uninsured status I want him to go to [facility] for his labs    We are getting a urine microalbumin in the office today    We are also getting an eye exam      BG Diary:08/24/22  Patient is not checking sugars    Weight has been stable    Diabetes Complications   Retinopathy: No known retinopathy.  Last eye exam: Eye exam was obtained in the office  Neuropathy: Denies paresthesias or numbness in hands or feet. Denies any foot wounds.  Exercise: Minimal.  Diet: Fair.  Patient's medications, allergies, and social histories were reviewed and updated as appropriate.    ROS:     CONS:     No fever, no chills   EYES:     No diplopia, no blurry vision   CV:           No chest pain, no palpitations   PULM:     No SOB, no cough, no hemoptysis.   GI:            No nausea, no vomiting, no diarrhea, no constipation   ENDO:     No polyuria, no polydipsia, no heat intolerance, no cold intolerance       Past Medical History:  Problem List:  2021-05: Dyslipidemia  2021-05: Long term (current) use of oral hypoglycemic drugs  2021-04: Dysuria  2021-03: Controlled type 2 diabetes mellitus without complication,   without long-term current use of insulin ([facility])      Past Surgical History:  No past surgical history on file.     Allergies:  Patient has no known allergies.     Social History:  Social History     Tobacco Use    Smoking  "status: Former     Packs/day: 0.25     Years: 3.00     Pack years: 0.75     Types: Cigarettes     Start date: 3/9/2002     Quit date: 3/9/2005     Years since quittin.4    Smokeless tobacco: Never    Tobacco comments:     smoked for about 3 or 4 years few a day 5-6   Vaping Use    Vaping Use: Never used   Substance Use Topics    Alcohol use: Yes     Alcohol/week: 5.4 oz     Types: 9 Shots of liquor per week     Comment: 3 shots 3 times a week    Drug use: No        Family History:   family history includes No Known Problems in his brother, brother, brother, father, maternal grandfather, maternal grandmother, maternal uncle, mother, paternal aunt, paternal grandfather, paternal grandmother, paternal uncle, paternal uncle, and paternal uncle.      PHYSICAL EXAM:   OBJECTIVE:  Vital signs: /76 (BP Location: Left arm, Patient Position: Sitting, BP Cuff Size: Adult)   Pulse 91   Ht 1.854 m (6' 1\")   Wt 95.3 kg (210 lb)   SpO2 99%   BMI 27.71 kg/m²   GENERAL: Well-developed, well-nourished in no apparent distress.   EYE:  No ocular asymmetry, PERRLA  HENT: Pink, moist mucous membranes.    NECK: No thyromegaly.   CARDIOVASCULAR:  No murmurs  LUNGS: Clear breath sounds  ABDOMEN: Soft, nontender   EXTREMITIES: No clubbing, cyanosis, or edema.   NEUROLOGICAL: No gross focal motor abnormalities   LYMPH: No cervical adenopathy palpated.   SKIN: No rashes, lesions.     Labs:  Lab Results   Component Value Date/Time    HBA1C 6.1 (A) 2022 03:47 PM        Lab Results   Component Value Date/Time    WBC 8.2 2021 12:07 AM    RBC 5.87 2021 12:07 AM    HEMOGLOBIN 18.5 (H) 2021 12:07 AM    MCV 93.4 2021 12:07 AM    MCH 31.5 2021 12:07 AM    MCHC 33.8 2021 12:07 AM    RDW 48.7 2021 12:07 AM    MPV 10.9 2021 12:07 AM       Lab Results   Component Value Date/Time    SODIUM 148 (H) 2021 12:07 AM    POTASSIUM 3.9 2021 12:07 AM    CHLORIDE 103 2021 12:07 AM "    CO2 28 07/25/2021 12:07 AM    ANION 17.0 (H) 07/25/2021 12:07 AM    GLUCOSE 120 (H) 07/25/2021 12:07 AM    BUN 14 07/25/2021 12:07 AM    CREATININE 1.12 07/25/2021 12:07 AM    CALCIUM 9.0 07/25/2021 12:07 AM    ASTSGOT 42 07/25/2021 12:07 AM    ALTSGPT 40 07/25/2021 12:07 AM    TBILIRUBIN 0.5 07/25/2021 12:07 AM    ALBUMIN 4.8 07/25/2021 12:07 AM    TOTPROTEIN 8.3 (H) 07/25/2021 12:07 AM    GLOBULIN 3.5 07/25/2021 12:07 AM    AGRATIO 1.4 07/25/2021 12:07 AM       Lab Results   Component Value Date/Time    CHOLSTRLTOT 204 (H) 03/10/2021 0924    TRIGLYCERIDE 105 03/10/2021 0924    HDL 56 03/10/2021 0924     (H) 03/10/2021 0924       Lab Results   Component Value Date/Time    MALBCRT see below 03/10/2021 09:24 AM    MICROALBUR <1.2 03/10/2021 09:24 AM        No results found for: TSHULTRASEN  No results found for: FREEDIR  No results found for: FREET3  No results found for: THYSTIMIG        ASSESSMENT/PLAN:     1. Controlled type 2 diabetes mellitus without complication, without long-term current use of insulin (HCC)  Controlled  A1c is 6.0%  Advised patient to restart Metformin if he has signs or symptoms of uncontrolled hyperglycemia  OK with diet and exercise   Recommend updated labs for serum crea, GFR and TSH and lipids this week  Urine albumin obtained today  Eye exam obtained today  RTC 11 months due to financial issues      2. Dyslipidemia  Stable  Continue atorvastatin  Repeat fasting lipids this week    3. Long term (current) use of oral hypoglycemic drugs  Patient used to be on metformin but has stopped due to personal reasons But he had no adverse reaction to metformin      Return in about 11 months (around 7/24/2023).      This patient during there office visit today was started on a new medication.  Side effects of the new medication were discussed with the patient today in the office.     Thank you kindly for allowing me to participate in the diabetes care plan for this patient.    Rickey  Sam MCGILL, FACE, [facility]  08/24/22    CC:   ARTUR Palencia

## 2022-08-25 LAB
CREAT UR-MCNC: 156.69 MG/DL
MICROALBUMIN UR-MCNC: 1.2 MG/DL
MICROALBUMIN/CREAT UR: 8 MG/G (ref 0–30)

## 2022-09-06 LAB — RETINAL SCREEN: NEGATIVE

## 2022-11-15 ENCOUNTER — OFFICE VISIT (OUTPATIENT)
Dept: URGENT CARE | Facility: PHYSICIAN GROUP | Age: 52
End: 2022-11-15

## 2022-11-15 VITALS
HEART RATE: 84 BPM | DIASTOLIC BLOOD PRESSURE: 78 MMHG | TEMPERATURE: 98.3 F | OXYGEN SATURATION: 98 % | SYSTOLIC BLOOD PRESSURE: 136 MMHG | RESPIRATION RATE: 18 BRPM | HEIGHT: 73 IN | BODY MASS INDEX: 27.57 KG/M2 | WEIGHT: 208 LBS

## 2022-11-15 DIAGNOSIS — R30.0 BURNING WITH URINATION: ICD-10-CM

## 2022-11-15 LAB
APPEARANCE UR: CLEAR
BILIRUB UR STRIP-MCNC: NORMAL MG/DL
COLOR UR AUTO: YELLOW
GLUCOSE UR STRIP.AUTO-MCNC: NORMAL MG/DL
KETONES UR STRIP.AUTO-MCNC: NORMAL MG/DL
LEUKOCYTE ESTERASE UR QL STRIP.AUTO: NORMAL
NITRITE UR QL STRIP.AUTO: NORMAL
PH UR STRIP.AUTO: 5.5 [PH] (ref 5–8)
PROT UR QL STRIP: NORMAL MG/DL
RBC UR QL AUTO: NORMAL
SP GR UR STRIP.AUTO: >=1.03
UROBILINOGEN UR STRIP-MCNC: 0.2 MG/DL

## 2022-11-15 PROCEDURE — 81002 URINALYSIS NONAUTO W/O SCOPE: CPT | Performed by: STUDENT IN AN ORGANIZED HEALTH CARE EDUCATION/TRAINING PROGRAM

## 2022-11-15 PROCEDURE — 99213 OFFICE O/P EST LOW 20 MIN: CPT | Performed by: STUDENT IN AN ORGANIZED HEALTH CARE EDUCATION/TRAINING PROGRAM

## 2022-11-15 ASSESSMENT — FIBROSIS 4 INDEX: FIB4 SCORE: 1.87

## 2022-11-16 NOTE — PROGRESS NOTES
"Subjective:   Jose Null is a 52 y.o. male who presents for UTI      HPI:  Pleasant 52-year-old male presents to clinic for burning with urination that occurred 2 days ago.  He states that it has greatly improved and he only has a very small amount of burning intermittently today.  He does report a history of similar symptoms with the last episode being in 2021.  Denies fever, chills, nausea, vomiting, abdominal pain, diarrhea, constipation, increased urinary frequency, flank pain, back pain, penile discharge, penile lesions, testicular pain, testicular swelling, chest pain, cough, shortness of breath, dizziness, or headache.      Medications:    atorvastatin Tabs    Allergies: Patient has no known allergies.    Problem List: Jose Null does not have any pertinent problems on file.    Surgical History:  No past surgical history on file.    Past Social Hx: Jose Null  reports that he quit smoking about 17 years ago. His smoking use included cigarettes. He started smoking about 20 years ago. He has a 0.75 pack-year smoking history. He has never used smokeless tobacco. He reports current alcohol use of about 5.4 oz per week. He reports that he does not use drugs.     Past Family Hx:  Jose Null family history includes No Known Problems in his brother, brother, brother, father, maternal grandfather, maternal grandmother, maternal uncle, mother, paternal aunt, paternal grandfather, paternal grandmother, paternal uncle, paternal uncle, and paternal uncle.     Problem list, medications, and allergies reviewed by myself today in Epic.     Objective:     /78   Pulse 84   Temp 36.8 °C (98.3 °F)   Resp 18   Ht 1.854 m (6' 1\")   Wt 94.3 kg (208 lb)   SpO2 98%   BMI 27.44 kg/m²     Physical Exam  Vitals reviewed.   Constitutional:       General: He is not in acute distress.     Appearance: Normal appearance.   HENT:      Mouth/Throat:      Mouth: Mucous membranes " are moist.   Cardiovascular:      Rate and Rhythm: Normal rate and regular rhythm.      Pulses: Normal pulses.      Heart sounds: Normal heart sounds. No murmur heard.  Pulmonary:      Effort: Pulmonary effort is normal. No respiratory distress.      Breath sounds: Normal breath sounds. No stridor. No wheezing, rhonchi or rales.   Abdominal:      Tenderness: There is no right CVA tenderness or left CVA tenderness.   Skin:     General: Skin is warm and dry.      Capillary Refill: Capillary refill takes less than 2 seconds.      Findings: No erythema, lesion or rash.   Neurological:      General: No focal deficit present.      Mental Status: He is alert and oriented to person, place, and time.       Assessment/Plan:     Diagnosis and associated orders:     1. Burning with urination  POCT Urinalysis    URINE CULTURE(NEW)         Comments/MDM:     POCT urinalysis shows small blood.  No signs of infection at this time.  We will do a urine culture for further evaluation of possible UTI.  Patient is agreeable to waiting for urine culture results before antibiotic therapy.  Patient will be contacted in approximately 3 days when results are back.  If there is any positive result that requires antibiotics I will contact the patient over the phone and send a prescription at that time.  I did discuss with the patient that this could be interstitial cystitis.  Advised him to push increased fluids.  Vitals all within normal limits and he is afebrile.  No CVA tenderness.  No abdominal tenderness or flank pain.  I do not suspect pyelonephritis or kidney stones.  He is well-appearing and stable.    ED/return precautions were given.         Differential diagnosis, natural history, supportive care, and indications for immediate follow-up discussed.    Advised the patient to follow-up with the primary care physician for recheck, reevaluation, and consideration of further management.    Please note that this dictation was created using  voice recognition software. I have made a reasonable attempt to correct obvious errors, but I expect that there are errors of grammar and possibly content that I did not discover before finalizing the note.    Electronically signed by Brett Louie PA-C.

## 2023-05-28 DIAGNOSIS — E78.5 DYSLIPIDEMIA: ICD-10-CM

## 2023-05-29 RX ORDER — ATORVASTATIN CALCIUM 20 MG/1
20 TABLET, FILM COATED ORAL DAILY
Qty: 90 TABLET | Refills: 3 | Status: SHIPPED | OUTPATIENT
Start: 2023-05-29 | End: 2023-10-03 | Stop reason: SDUPTHER

## 2023-08-23 ENCOUNTER — APPOINTMENT (OUTPATIENT)
Dept: ENDOCRINOLOGY | Facility: MEDICAL CENTER | Age: 53
End: 2023-08-23
Attending: INTERNAL MEDICINE

## 2023-09-28 DIAGNOSIS — E78.5 DYSLIPIDEMIA: ICD-10-CM

## 2023-09-28 DIAGNOSIS — E55.9 VITAMIN D DEFICIENCY: ICD-10-CM

## 2023-09-28 DIAGNOSIS — Z79.84 LONG TERM (CURRENT) USE OF ORAL HYPOGLYCEMIC DRUGS: ICD-10-CM

## 2023-09-28 DIAGNOSIS — E11.9 CONTROLLED TYPE 2 DIABETES MELLITUS WITHOUT COMPLICATION, WITHOUT LONG-TERM CURRENT USE OF INSULIN (HCC): ICD-10-CM

## 2023-10-02 ENCOUNTER — OFFICE VISIT (OUTPATIENT)
Dept: ENDOCRINOLOGY | Facility: MEDICAL CENTER | Age: 53
End: 2023-10-02
Attending: INTERNAL MEDICINE

## 2023-10-02 DIAGNOSIS — E11.9 CONTROLLED TYPE 2 DIABETES MELLITUS WITHOUT COMPLICATION, WITHOUT LONG-TERM CURRENT USE OF INSULIN (HCC): ICD-10-CM

## 2023-10-02 DIAGNOSIS — Z79.84 LONG TERM (CURRENT) USE OF ORAL HYPOGLYCEMIC DRUGS: ICD-10-CM

## 2023-10-03 ENCOUNTER — OFFICE VISIT (OUTPATIENT)
Dept: ENDOCRINOLOGY | Facility: MEDICAL CENTER | Age: 53
End: 2023-10-03
Attending: INTERNAL MEDICINE

## 2023-10-03 VITALS
OXYGEN SATURATION: 95 % | RESPIRATION RATE: 20 BRPM | BODY MASS INDEX: 28.18 KG/M2 | HEART RATE: 100 BPM | DIASTOLIC BLOOD PRESSURE: 72 MMHG | WEIGHT: 212.6 LBS | SYSTOLIC BLOOD PRESSURE: 118 MMHG | HEIGHT: 73 IN

## 2023-10-03 DIAGNOSIS — E78.5 DYSLIPIDEMIA: ICD-10-CM

## 2023-10-03 DIAGNOSIS — E11.9 CONTROLLED TYPE 2 DIABETES MELLITUS WITHOUT COMPLICATION, WITHOUT LONG-TERM CURRENT USE OF INSULIN (HCC): ICD-10-CM

## 2023-10-03 DIAGNOSIS — Z79.84 LONG TERM (CURRENT) USE OF ORAL HYPOGLYCEMIC DRUGS: ICD-10-CM

## 2023-10-03 DIAGNOSIS — R74.8 ELEVATED LIVER ENZYMES: ICD-10-CM

## 2023-10-03 LAB
HBA1C MFR BLD: 6.9 % (ref ?–5.8)
POCT INT CON NEG: NEGATIVE
POCT INT CON POS: POSITIVE

## 2023-10-03 PROCEDURE — 3078F DIAST BP <80 MM HG: CPT | Performed by: INTERNAL MEDICINE

## 2023-10-03 PROCEDURE — 3074F SYST BP LT 130 MM HG: CPT | Performed by: INTERNAL MEDICINE

## 2023-10-03 PROCEDURE — 92250 FUNDUS PHOTOGRAPHY W/I&R: CPT | Performed by: INTERNAL MEDICINE

## 2023-10-03 PROCEDURE — 83036 HEMOGLOBIN GLYCOSYLATED A1C: CPT | Performed by: INTERNAL MEDICINE

## 2023-10-03 PROCEDURE — 99214 OFFICE O/P EST MOD 30 MIN: CPT | Performed by: INTERNAL MEDICINE

## 2023-10-03 PROCEDURE — 99212 OFFICE O/P EST SF 10 MIN: CPT | Performed by: INTERNAL MEDICINE

## 2023-10-03 RX ORDER — ATORVASTATIN CALCIUM 20 MG/1
20 TABLET, FILM COATED ORAL DAILY
Qty: 90 TABLET | Refills: 3 | Status: SHIPPED | OUTPATIENT
Start: 2023-10-03

## 2023-10-03 NOTE — PROGRESS NOTES
CHIEF COMPLAINT: Patient is here for follow up of Type 2 Diabetes Mellitus    HPI:     Jose Null is a 53 y.o. male with Type 2 Diabetes Mellitus here for follow up.      Labs from 10/3/2023 hba1c is 6.6%  Labs from 8/24/2022 HbA1c is 6.1%    Last seen as a consult last year  He has no insurance and works contractually  A1c used to be  10% but he got it under control initially with meds then followed through with diet and exercise and got off meds  He used to be on Metformin but is now diet controlled        He is active and exercises regularly  He denies any hyperglycemic symptoms of polyuria and polydipsia  He completed labs at [email or web address] which showed elevated Ast of 42 and ALT of 5  He admits to drinking alcohol nightly 3-4 shots of Tequila every night         He has hyperlipidemia and is on atorvastatin   LDL-C was 77 a on 9/2023  ([facility] [[email or web address]    He does not have diabetic kidney disease  UACR was < 30 on 8/24/2022 ( [facility])  egFR  was > 60 on 9/2023 ([facility] [[email or web address]       He had an eye exam on 10/3/2023 (POC exam)       BG Diary:08/24/22  Patient is not checking sugars    Weight has been stable    Diabetes Complications   Retinopathy: No known retinopathy.  Last eye exam: 10/3/2023 POC  Neuropathy: Denies paresthesias or numbness in hands or feet. Denies any foot wounds.  Exercise: Minimal.  Diet: Fair.  Patient's medications, allergies, and social histories were reviewed and updated as appropriate.    ROS:     CONS:     No fever, no chills   EYES:     No diplopia, no blurry vision   CV:           No chest pain, no palpitations   PULM:     No SOB, no cough, no hemoptysis.   GI:            No nausea, no vomiting, no diarrhea, no constipation   ENDO:     No polyuria, no polydipsia, no heat intolerance, no cold intolerance       Past Medical History:  Problem List:  2021-05: Dyslipidemia  2021-05: Long term (current) use of oral hypoglycemic drugs  2021-04: Dysuria  2021-03: Controlled type 2 diabetes mellitus without  "complication,   without long-term current use of insulin (HCC)      Past Surgical History:  No past surgical history on file.     Allergies:  Patient has no known allergies.     Social History:  Social History     Tobacco Use    Smoking status: Former     Current packs/day: 0.00     Average packs/day: 0.3 packs/day for 3.0 years (0.8 ttl pk-yrs)     Types: Cigarettes     Start date: 3/9/2002     Quit date: 3/9/2005     Years since quittin.5    Smokeless tobacco: Never    Tobacco comments:     smoked for about 3 or 4 years few a day 5-6   Vaping Use    Vaping Use: Never used   Substance Use Topics    Alcohol use: Yes     Alcohol/week: 5.4 oz     Types: 9 Shots of liquor per week     Comment: 3 shots 3 times a week    Drug use: No        Family History:   family history includes No Known Problems in his brother, brother, brother, father, maternal grandfather, maternal grandmother, maternal uncle, mother, paternal aunt, paternal grandfather, paternal grandmother, paternal uncle, paternal uncle, and paternal uncle.      PHYSICAL EXAM:   OBJECTIVE:  Vital signs: /72 (BP Location: Left arm, Patient Position: Sitting, BP Cuff Size: Adult)   Pulse 100   Resp 20   Ht 1.854 m (6' 1\")   Wt 96.4 kg (212 lb 9.6 oz)   SpO2 95%   BMI 28.05 kg/m²   GENERAL: Well-developed, well-nourished in no apparent distress.   EYE:  No ocular asymmetry, PERRLA  HENT: Pink, moist mucous membranes.    NECK: No thyromegaly.   CARDIOVASCULAR:  No murmurs  LUNGS: Clear breath sounds  ABDOMEN: Soft, nontender   EXTREMITIES: No clubbing, cyanosis, or edema.   NEUROLOGICAL: No gross focal motor abnormalities   LYMPH: No cervical adenopathy palpated.   SKIN: No rashes, lesions.     Labs:  Lab Results   Component Value Date/Time    HBA1C 6.9 (A) 10/03/2023 11:38 AM        Lab Results   Component Value Date/Time    WBC 8.2 2021 12:07 AM    RBC 5.87 2021 12:07 AM    HEMOGLOBIN 18.5 (H) 2021 12:07 AM    MCV 93.4 2021 " 12:07 AM    MCH 31.5 07/25/2021 12:07 AM    MCHC 33.8 07/25/2021 12:07 AM    RDW 48.7 07/25/2021 12:07 AM    MPV 10.9 07/25/2021 12:07 AM       Lab Results   Component Value Date/Time    SODIUM 148 (H) 07/25/2021 12:07 AM    POTASSIUM 3.9 07/25/2021 12:07 AM    CHLORIDE 103 07/25/2021 12:07 AM    CO2 28 07/25/2021 12:07 AM    ANION 17.0 (H) 07/25/2021 12:07 AM    GLUCOSE 120 (H) 07/25/2021 12:07 AM    BUN 14 07/25/2021 12:07 AM    CREATININE 1.12 07/25/2021 12:07 AM    CALCIUM 9.0 07/25/2021 12:07 AM    ASTSGOT 42 07/25/2021 12:07 AM    ALTSGPT 40 07/25/2021 12:07 AM    TBILIRUBIN 0.5 07/25/2021 12:07 AM    ALBUMIN 4.8 07/25/2021 12:07 AM    TOTPROTEIN 8.3 (H) 07/25/2021 12:07 AM    GLOBULIN 3.5 07/25/2021 12:07 AM    AGRATIO 1.4 07/25/2021 12:07 AM       Lab Results   Component Value Date/Time    CHOLSTRLTOT 204 (H) 03/10/2021 0924    TRIGLYCERIDE 105 03/10/2021 0924    HDL 56 03/10/2021 0924     (H) 03/10/2021 0924       Lab Results   Component Value Date/Time    MALBCRT 8 08/24/2022 04:07 PM    MICROALBUR 1.2 08/24/2022 04:07 PM        No results found for: TSHULTRASEN  No results found for: FREEDIR  No results found for: FREET3  No results found for: THYSTIMIG        ASSESSMENT/PLAN:     1. Controlled type 2 diabetes mellitus without complication, without long-term current use of insulin (HCC)  Controlled  A1c is 6.6%  He doesn't want to restart metformin at this time  Continue  diet and exercise   Recommend updated labs for urine albumin this week  Eye exam obtained today  RTC 12 months due to financial issues      2. Dyslipidemia  Stable  Continue atorvastatin  Repeat fasting lipids in 1 year    3. Long term (current) use of oral hypoglycemic drugs  Patient used to be on metformin but has stopped due to personal reasons   But he had no adverse reaction to metformin    4. Elevated liver enzymes  Unstable  This is ETOH related  Advised moderation on ETOH intake  Reviewed risk of liver damage and  cirrhosis  Continue monitoring       Return in about 1 year (around 10/3/2024).      Thank you kindly for allowing me to participate in the diabetes care plan for this patient.    Rickey Vargas MD, FACE, [facility]      CC:   WOLF Palencia.

## 2023-10-04 LAB — RETINAL SCREEN: NEGATIVE

## 2024-01-01 NOTE — ASSESSMENT & PLAN NOTE
New problem to examiner.  Patient reports that approximately 5 days ago he noticed he was having burning with voiding, incomplete bladder emptying, urinary frequency.  Denies fevers, chills, flank pain, hematuria, penile drainage.  He has not had a urinary tract infection in the past.  Reports that he generally eats a lot of spicy food, only drinks a small amount of coffee daily, drinks plenty of water.  Reports that the dysuria is coming and going, but it is a new problem for him so he became concerned and wanted to be evaluated.  
Started first trimester

## 2024-02-20 ENCOUNTER — OFFICE VISIT (OUTPATIENT)
Dept: URGENT CARE | Facility: PHYSICIAN GROUP | Age: 54
End: 2024-02-20

## 2024-02-20 VITALS
WEIGHT: 210 LBS | HEIGHT: 73 IN | OXYGEN SATURATION: 94 % | DIASTOLIC BLOOD PRESSURE: 80 MMHG | BODY MASS INDEX: 27.83 KG/M2 | HEART RATE: 93 BPM | RESPIRATION RATE: 16 BRPM | TEMPERATURE: 98.6 F | SYSTOLIC BLOOD PRESSURE: 142 MMHG

## 2024-02-20 DIAGNOSIS — R10.32 LEFT LOWER QUADRANT PAIN: ICD-10-CM

## 2024-02-20 LAB
APPEARANCE UR: CLEAR
BILIRUB UR STRIP-MCNC: NORMAL MG/DL
COLOR UR AUTO: YELLOW
GLUCOSE UR STRIP.AUTO-MCNC: NORMAL MG/DL
KETONES UR STRIP.AUTO-MCNC: NORMAL MG/DL
LEUKOCYTE ESTERASE UR QL STRIP.AUTO: NORMAL
NITRITE UR QL STRIP.AUTO: NORMAL
PH UR STRIP.AUTO: 7 [PH] (ref 5–8)
PROT UR QL STRIP: NORMAL MG/DL
RBC UR QL AUTO: NORMAL
SP GR UR STRIP.AUTO: 1.02
UROBILINOGEN UR STRIP-MCNC: 0.2 MG/DL

## 2024-02-20 PROCEDURE — 81002 URINALYSIS NONAUTO W/O SCOPE: CPT | Performed by: FAMILY MEDICINE

## 2024-02-20 PROCEDURE — 3077F SYST BP >= 140 MM HG: CPT | Performed by: FAMILY MEDICINE

## 2024-02-20 PROCEDURE — 99213 OFFICE O/P EST LOW 20 MIN: CPT | Performed by: FAMILY MEDICINE

## 2024-02-20 PROCEDURE — 3079F DIAST BP 80-89 MM HG: CPT | Performed by: FAMILY MEDICINE

## 2024-02-20 RX ORDER — TAMSULOSIN HYDROCHLORIDE 0.4 MG/1
0.4 CAPSULE ORAL
Qty: 30 CAPSULE | Refills: 0 | Status: SHIPPED | OUTPATIENT
Start: 2024-02-20

## 2024-02-20 ASSESSMENT — ENCOUNTER SYMPTOMS
NAUSEA: 0
ABDOMINAL PAIN: 1
FEVER: 0
VOMITING: 0
CONSTIPATION: 0
BLOOD IN STOOL: 0
DIARRHEA: 0
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
## 2024-03-06 ENCOUNTER — HOSPITAL ENCOUNTER (OUTPATIENT)
Dept: LAB | Facility: MEDICAL CENTER | Age: 54
End: 2024-03-06
Attending: NURSE PRACTITIONER

## 2024-03-06 ENCOUNTER — OFFICE VISIT (OUTPATIENT)
Dept: MEDICAL GROUP | Facility: PHYSICIAN GROUP | Age: 54
End: 2024-03-06

## 2024-03-06 VITALS
DIASTOLIC BLOOD PRESSURE: 62 MMHG | TEMPERATURE: 98 F | OXYGEN SATURATION: 97 % | WEIGHT: 210 LBS | HEIGHT: 73 IN | HEART RATE: 80 BPM | SYSTOLIC BLOOD PRESSURE: 116 MMHG | BODY MASS INDEX: 27.83 KG/M2

## 2024-03-06 DIAGNOSIS — R10.32 LEFT LOWER QUADRANT ABDOMINAL PAIN: ICD-10-CM

## 2024-03-06 LAB
ALBUMIN SERPL BCP-MCNC: 4.3 G/DL (ref 3.2–4.9)
ALBUMIN/GLOB SERPL: 1.4 G/DL
ALP SERPL-CCNC: 121 U/L (ref 30–99)
ALT SERPL-CCNC: 54 U/L (ref 2–50)
ANION GAP SERPL CALC-SCNC: 14 MMOL/L (ref 7–16)
AST SERPL-CCNC: 33 U/L (ref 12–45)
BASOPHILS # BLD AUTO: 0.7 % (ref 0–1.8)
BASOPHILS # BLD: 0.04 K/UL (ref 0–0.12)
BILIRUB SERPL-MCNC: 0.4 MG/DL (ref 0.1–1.5)
BUN SERPL-MCNC: 13 MG/DL (ref 8–22)
CALCIUM ALBUM COR SERPL-MCNC: 8.9 MG/DL (ref 8.5–10.5)
CALCIUM SERPL-MCNC: 9.1 MG/DL (ref 8.5–10.5)
CHLORIDE SERPL-SCNC: 107 MMOL/L (ref 96–112)
CO2 SERPL-SCNC: 22 MMOL/L (ref 20–33)
CREAT SERPL-MCNC: 1.26 MG/DL (ref 0.5–1.4)
EOSINOPHIL # BLD AUTO: 0.12 K/UL (ref 0–0.51)
EOSINOPHIL NFR BLD: 2 % (ref 0–6.9)
ERYTHROCYTE [DISTWIDTH] IN BLOOD BY AUTOMATED COUNT: 44.7 FL (ref 35.9–50)
GFR SERPLBLD CREATININE-BSD FMLA CKD-EPI: 68 ML/MIN/1.73 M 2
GLOBULIN SER CALC-MCNC: 3 G/DL (ref 1.9–3.5)
GLUCOSE SERPL-MCNC: 150 MG/DL (ref 65–99)
HCT VFR BLD AUTO: 49.1 % (ref 42–52)
HGB BLD-MCNC: 17.3 G/DL (ref 14–18)
IMM GRANULOCYTES # BLD AUTO: 0.01 K/UL (ref 0–0.11)
IMM GRANULOCYTES NFR BLD AUTO: 0.2 % (ref 0–0.9)
LYMPHOCYTES # BLD AUTO: 2.54 K/UL (ref 1–4.8)
LYMPHOCYTES NFR BLD: 41.6 % (ref 22–41)
MCH RBC QN AUTO: 31.6 PG (ref 27–33)
MCHC RBC AUTO-ENTMCNC: 35.2 G/DL (ref 32.3–36.5)
MCV RBC AUTO: 89.6 FL (ref 81.4–97.8)
MONOCYTES # BLD AUTO: 0.81 K/UL (ref 0–0.85)
MONOCYTES NFR BLD AUTO: 13.3 % (ref 0–13.4)
NEUTROPHILS # BLD AUTO: 2.59 K/UL (ref 1.82–7.42)
NEUTROPHILS NFR BLD: 42.2 % (ref 44–72)
NRBC # BLD AUTO: 0 K/UL
NRBC BLD-RTO: 0 /100 WBC (ref 0–0.2)
PLATELET # BLD AUTO: 171 K/UL (ref 164–446)
PMV BLD AUTO: 12 FL (ref 9–12.9)
POTASSIUM SERPL-SCNC: 4.7 MMOL/L (ref 3.6–5.5)
PROT SERPL-MCNC: 7.3 G/DL (ref 6–8.2)
RBC # BLD AUTO: 5.48 M/UL (ref 4.7–6.1)
SODIUM SERPL-SCNC: 143 MMOL/L (ref 135–145)
WBC # BLD AUTO: 6.1 K/UL (ref 4.8–10.8)

## 2024-03-06 PROCEDURE — 3078F DIAST BP <80 MM HG: CPT | Performed by: NURSE PRACTITIONER

## 2024-03-06 PROCEDURE — 99213 OFFICE O/P EST LOW 20 MIN: CPT | Performed by: NURSE PRACTITIONER

## 2024-03-06 PROCEDURE — 36415 COLL VENOUS BLD VENIPUNCTURE: CPT

## 2024-03-06 PROCEDURE — 85025 COMPLETE CBC W/AUTO DIFF WBC: CPT

## 2024-03-06 PROCEDURE — 80053 COMPREHEN METABOLIC PANEL: CPT

## 2024-03-06 PROCEDURE — 3074F SYST BP LT 130 MM HG: CPT | Performed by: NURSE PRACTITIONER

## 2024-03-06 ASSESSMENT — ENCOUNTER SYMPTOMS
EYES NEGATIVE: 1
ALLERGIC/IMMUNOLOGIC NEGATIVE: 1
RESPIRATORY NEGATIVE: 1
MUSCULOSKELETAL NEGATIVE: 1
ENDOCRINE NEGATIVE: 1
HEMATOLOGIC/LYMPHATIC NEGATIVE: 1
CARDIOVASCULAR NEGATIVE: 1
CONSTITUTIONAL NEGATIVE: 1
NEUROLOGICAL NEGATIVE: 1
PSYCHIATRIC NEGATIVE: 1

## 2024-03-06 ASSESSMENT — PATIENT HEALTH QUESTIONNAIRE - PHQ9: CLINICAL INTERPRETATION OF PHQ2 SCORE: 0

## 2024-03-06 NOTE — PROGRESS NOTES
"Verbal consent was acquired by the patient to use [employer] ambient listening note generation during this visit Yes      Subjective   Jose Null is a 54 y.o. male who presents for:  History of Present Illness  The patient presents for evaluation of left-sided abdominal pain. Also present is a  on iPad.    He went to urgent care on 2/20/2024 and was treated for a possible kidney stone due to LLQ pain and trace blood on UA. He was prescribed tamsulosin 0.4 mg once daily for 30 days, reports that the medication has not made any difference. He does not feel any better. It is not painful, but he feels a heaviness on the left side. It has not gotten any better or worse since he was in urgent care. He denies any injury or trauma to the left side. He does not feel anything right now, but he feels the heaviness only when he is sleeping. It does not ache sometimes it is a stabbing sensation. He is urinating okay, denies gross hematuria. He denies any fevers or chills. He does not feel anything when he pushes on his side. This has been going on for 1.5 to 2 months.    Review of Systems   Constitutional: Negative.    HENT: Negative.     Eyes: Negative.    Respiratory: Negative.     Cardiovascular: Negative.    Gastrointestinal:         Left side abdominal discomfort   Endocrine: Negative.    Genitourinary: Negative.    Musculoskeletal: Negative.    Skin: Negative.    Allergic/Immunologic: Negative.    Neurological: Negative.    Hematological: Negative.    Psychiatric/Behavioral: Negative.       Objective   /62 (BP Location: Left arm, Patient Position: Sitting, BP Cuff Size: Large adult)   Pulse 80   Temp 36.7 °C (98 °F) (Temporal)   Ht 1.854 m (6' 1\")   Wt 95.3 kg (210 lb)   SpO2 97%   Physical Exam  General: Well nourished, well developed male in NAD, awake and conversant.  Eyes: Normal conjunctiva, anicteric.  Round symmetrical pupils.  ENT: Hearing grossly intact.  No nasal " discharge.  Neck: Neck is supple.  No masses or thyromegaly.  CV: No lower extremity edema.  Respiratory: Respirations are nonlabored.  No wheezing.  Abdomen: Non-Distended. Non-tender to palpation.  Skin: Warm.  No rashes or ulcers.  MSK: Normal ambulation.  No clubbing or cyanosis.  Neuro: Sensation and CN II-XII grossly normal.  Psych: Alert and oriented.  Cooperative, appropriate mood and affect, normal judgment.          3/6/2024     3:19 PM    SERVICES   Is patient using  services for this encounter? Yes   Language Interpreted Luxembourgish    Name Nitin [id number]    Mode iPad   Content of Interpretation (select all) History/Visit information;Patient Education;Consent;Orders;Discharge Instructions (AVS);Medications     Assessment & Plan  1. Left lower quadrant abdominal pain  New to examiner, ongoing for patient for the past 1.5-2 months, reports it only aggravates him while he is sleeping. Plan for him to complete a CBC and CMP today, he will call to schedule abdominal ultrasound, we will call him with results once received. He should be seen in UC or ER with worsening or concerning symptoms.  - US-ABDOMEN COMPLETE SURVEY; Future  - CBC WITH DIFFERENTIAL; Future  - Comp Metabolic Panel; Future     Return if symptoms worsen or fail to improve.     Please note that this dictation was created using voice recognition software. I have made every reasonable attempt to correct obvious errors, but I expect that there are errors of grammar and possibly content that I did not discover before finalizing the note.

## 2024-03-07 ENCOUNTER — APPOINTMENT (OUTPATIENT)
Dept: RADIOLOGY | Facility: MEDICAL CENTER | Age: 54
End: 2024-03-07
Attending: NURSE PRACTITIONER

## 2024-03-07 DIAGNOSIS — R10.32 LEFT LOWER QUADRANT ABDOMINAL PAIN: ICD-10-CM

## 2024-03-07 PROCEDURE — 76700 US EXAM ABDOM COMPLETE: CPT

## 2024-03-12 DIAGNOSIS — N32.89 BLADDER TRABECULATION: ICD-10-CM

## 2024-07-01 ENCOUNTER — APPOINTMENT (OUTPATIENT)
Dept: MEDICAL GROUP | Facility: PHYSICIAN GROUP | Age: 54
End: 2024-07-01

## 2024-07-01 VITALS
DIASTOLIC BLOOD PRESSURE: 78 MMHG | TEMPERATURE: 97.7 F | HEIGHT: 73 IN | WEIGHT: 191.9 LBS | BODY MASS INDEX: 25.43 KG/M2 | SYSTOLIC BLOOD PRESSURE: 138 MMHG | OXYGEN SATURATION: 98 % | HEART RATE: 100 BPM

## 2024-07-01 DIAGNOSIS — Z12.11 SCREENING FOR COLORECTAL CANCER: ICD-10-CM

## 2024-07-01 DIAGNOSIS — Z12.12 SCREENING FOR COLORECTAL CANCER: ICD-10-CM

## 2024-07-01 DIAGNOSIS — N47.8 FORESKIN PROBLEM: ICD-10-CM

## 2024-07-01 PROCEDURE — 3075F SYST BP GE 130 - 139MM HG: CPT | Performed by: NURSE PRACTITIONER

## 2024-07-01 PROCEDURE — 99213 OFFICE O/P EST LOW 20 MIN: CPT | Performed by: NURSE PRACTITIONER

## 2024-07-01 PROCEDURE — 3078F DIAST BP <80 MM HG: CPT | Performed by: NURSE PRACTITIONER

## 2024-07-01 ASSESSMENT — FIBROSIS 4 INDEX: FIB4 SCORE: 1.418125640558682057

## 2024-07-16 ENCOUNTER — OFFICE VISIT (OUTPATIENT)
Dept: UROLOGY | Facility: MEDICAL CENTER | Age: 54
End: 2024-07-16

## 2024-07-16 VITALS
OXYGEN SATURATION: 96 % | BODY MASS INDEX: 25.31 KG/M2 | HEART RATE: 91 BPM | WEIGHT: 191 LBS | DIASTOLIC BLOOD PRESSURE: 77 MMHG | SYSTOLIC BLOOD PRESSURE: 131 MMHG | HEIGHT: 73 IN

## 2024-07-16 DIAGNOSIS — N47.1 PHIMOSIS: ICD-10-CM

## 2024-07-16 PROCEDURE — 3078F DIAST BP <80 MM HG: CPT | Performed by: UROLOGY

## 2024-07-16 PROCEDURE — 99202 OFFICE O/P NEW SF 15 MIN: CPT | Performed by: UROLOGY

## 2024-07-16 PROCEDURE — 3075F SYST BP GE 130 - 139MM HG: CPT | Performed by: UROLOGY

## 2024-07-16 RX ORDER — CLOTRIMAZOLE AND BETAMETHASONE DIPROPIONATE 10; .64 MG/G; MG/G
1 CREAM TOPICAL 2 TIMES DAILY
Qty: 45 G | Refills: 2 | Status: SHIPPED | OUTPATIENT
Start: 2024-07-16

## 2024-07-16 ASSESSMENT — FIBROSIS 4 INDEX: FIB4 SCORE: 1.418125640558682057

## 2024-10-01 ENCOUNTER — OFFICE VISIT (OUTPATIENT)
Dept: ENDOCRINOLOGY | Facility: MEDICAL CENTER | Age: 54
End: 2024-10-01
Attending: INTERNAL MEDICINE

## 2024-10-01 VITALS
WEIGHT: 189 LBS | OXYGEN SATURATION: 97 % | DIASTOLIC BLOOD PRESSURE: 62 MMHG | HEIGHT: 73 IN | HEART RATE: 76 BPM | BODY MASS INDEX: 25.05 KG/M2 | SYSTOLIC BLOOD PRESSURE: 124 MMHG

## 2024-10-01 DIAGNOSIS — Z79.84 LONG TERM (CURRENT) USE OF ORAL HYPOGLYCEMIC DRUGS: ICD-10-CM

## 2024-10-01 DIAGNOSIS — E11.65 TYPE 2 DIABETES MELLITUS WITH HYPERGLYCEMIA, WITHOUT LONG-TERM CURRENT USE OF INSULIN (HCC): ICD-10-CM

## 2024-10-01 DIAGNOSIS — E55.9 VITAMIN D DEFICIENCY: ICD-10-CM

## 2024-10-01 DIAGNOSIS — E78.5 DYSLIPIDEMIA: ICD-10-CM

## 2024-10-01 LAB
HBA1C MFR BLD: 12.4 % (ref ?–5.8)
POCT INT CON NEG: NEGATIVE
POCT INT CON POS: POSITIVE

## 2024-10-01 PROCEDURE — 99214 OFFICE O/P EST MOD 30 MIN: CPT | Performed by: INTERNAL MEDICINE

## 2024-10-01 PROCEDURE — 3078F DIAST BP <80 MM HG: CPT | Performed by: INTERNAL MEDICINE

## 2024-10-01 PROCEDURE — 3074F SYST BP LT 130 MM HG: CPT | Performed by: INTERNAL MEDICINE

## 2024-10-01 PROCEDURE — 83036 HEMOGLOBIN GLYCOSYLATED A1C: CPT | Performed by: INTERNAL MEDICINE

## 2024-10-01 PROCEDURE — 99212 OFFICE O/P EST SF 10 MIN: CPT | Performed by: INTERNAL MEDICINE

## 2024-10-01 RX ORDER — METFORMIN HYDROCHLORIDE 500 MG/1
1000 TABLET, EXTENDED RELEASE ORAL 2 TIMES DAILY
Qty: 360 TABLET | Refills: 3 | Status: SHIPPED | OUTPATIENT
Start: 2024-10-01

## 2024-10-01 ASSESSMENT — FIBROSIS 4 INDEX: FIB4 SCORE: 1.418125640558682057

## 2024-12-27 DIAGNOSIS — E78.5 DYSLIPIDEMIA: ICD-10-CM

## 2024-12-30 RX ORDER — ATORVASTATIN CALCIUM 20 MG/1
20 TABLET, FILM COATED ORAL DAILY
Qty: 90 TABLET | Refills: 3 | Status: SHIPPED | OUTPATIENT
Start: 2024-12-30

## 2025-01-07 ENCOUNTER — HOSPITAL ENCOUNTER (OUTPATIENT)
Facility: MEDICAL CENTER | Age: 55
End: 2025-01-07
Attending: PATHOLOGY
Payer: COMMERCIAL

## 2025-01-08 ENCOUNTER — OFFICE VISIT (OUTPATIENT)
Dept: ENDOCRINOLOGY | Facility: MEDICAL CENTER | Age: 55
End: 2025-01-08
Attending: INTERNAL MEDICINE

## 2025-01-08 VITALS — WEIGHT: 204 LBS | BODY MASS INDEX: 26.91 KG/M2

## 2025-01-08 DIAGNOSIS — E11.65 TYPE 2 DIABETES MELLITUS WITH HYPERGLYCEMIA, WITHOUT LONG-TERM CURRENT USE OF INSULIN (HCC): Primary | ICD-10-CM

## 2025-01-08 LAB
HBA1C MFR BLD: 6.9 % (ref ?–5.8)
POCT INT CON NEG: NEGATIVE
POCT INT CON POS: POSITIVE

## 2025-01-08 PROCEDURE — 83036 HEMOGLOBIN GLYCOSYLATED A1C: CPT

## 2025-01-08 PROCEDURE — 99999 PR NO CHARGE: CPT

## 2025-01-08 PROCEDURE — 99213 OFFICE O/P EST LOW 20 MIN: CPT

## 2025-01-08 ASSESSMENT — FIBROSIS 4 INDEX: FIB4 SCORE: 1.418125640558682057

## 2025-01-08 NOTE — PROGRESS NOTES
Patient Consult Note    Primary care physician: ARTUR Palencia    Reason for Consult: Management of Uncontrolled Type 2 Diabetes    Date Referral Placed: 10/01/24    HPI:  Jose Null is a 54 y.o. old patient who comes in today for evaluation of above stated problem.    Allergies  Patient has no known allergies.    Current Diabetes Medication Regimen  Metformin IR: 1000 mg BID    Previous Diabetes Medications and Reason for Discontinuation  None    Potential Barriers to Care:  Adherence: denies missed doses  Side effects: none  Affordability: no issues  Others: pt got labs drawn at [facility] [employer] yesterday, results pending    SMBG  Pt has home glucometer and proper testing technique - Not checking    Hypoglycemia  Hypoglycemia awareness: Yes  Nocturnal hypoglycemia: None  Hypoglycemia:  None  Pt's treatment of Hypoglycemia  Discussed 15:15 Rule    Lifestyle  Current Exercise - 10 mins a day of cardio/strength-training    Dietary  Breakfast - eggs, beans  Lunch - eats out typically; chicken, beans, tortilla  Dinner - sandwich  Snacks - none  Drinks - water    Labs  Lab Results   Component Value Date/Time    HBA1C 6.9 (A) 01/08/2025 10:25 AM    HBA1C 12.4 (A) 10/01/2024 09:04 AM    HBA1C 6.9 (A) 10/03/2023 11:38 AM      Lab Results   Component Value Date/Time    SODIUM 143 03/06/2024 03:22 PM    POTASSIUM 4.7 03/06/2024 03:22 PM    CHLORIDE 107 03/06/2024 03:22 PM    CO2 22 03/06/2024 03:22 PM    GLUCOSE 150 (H) 03/06/2024 03:22 PM    BUN 13 03/06/2024 03:22 PM    CREATININE 1.26 03/06/2024 03:22 PM     Lab Results   Component Value Date/Time    ALKPHOSPHAT 121 (H) 03/06/2024 03:22 PM    ASTSGOT 33 03/06/2024 03:22 PM    ALTSGPT 54 (H) 03/06/2024 03:22 PM    TBILIRUBIN 0.4 03/06/2024 03:22 PM    INR 1.03 07/26/2021 12:15 AM    ALBUMIN 4.3 03/06/2024 03:22 PM      Lab Results   Component Value Date/Time    CHOLSTRLTOT 204 (H) 03/10/2021 09:24 AM     (H) 03/10/2021 09:24 AM    HDL 56  03/10/2021 09:24 AM    TRIGLYCERIDE 105 03/10/2021 09:24 AM       Lab Results   Component Value Date/Time    MALBCRT 8 08/24/2022 04:07 PM    MICROALBUR 1.2 08/24/2022 04:07 PM       Physical Examination:  Vital signs: Wt 92.5 kg (204 lb)   BMI 26.91 kg/m²  Body mass index is 26.91 kg/m².    Assessment and Plan:    1. DM2  Basic physiology of DMII was explained to patient as well as microvascular/macrovascular complications. The importance of increasing physical activity to improve diabetes control was discussed with the patient. Patient was also educated on changing diet and making better choices to help control blood sugar.   Discussed Goals: FBG 80 - 130, 2hPP < 180, a1c < 7.0%  Last a1c drawn today  was 6.9%, which is at goal and has improved since the previous reading  Pt on metformin monotherapy which seems to be working well  Labs are still pending  No changes warranted today    - Medication changes:  None   - Continue:  Metformin IR 1000 mg BID     - Lifestyle changes:  Exercise Goal - increase as tolerated, aim for 150 mins per week of moderate intensity exercise  Dietary Goal - limit carb intake    - Preventative management:  REC DM Score: 3  Care gaps addressed:   A1c is above 8%: A1c checked today, now at goal  Lipid panel is due: Lab in process.  Microalbumin:creatine is due: Lab in process  Care gaps updated in Health Maintenance    Follow Up:  3 months for repeat A1c    Angelo Castellano, MarkusD    CC:   ARTUR Palencia MD          [facility] Pharmacotherapy Program Consent      Name    Jose Null    MRN number: [id number]    the following are guidelines for participation in the [facility] Pharmacotherapy Program.     I, ____Jose Null_____, understand and voluntarily agree to participate in the [facility] Pharmacotherapy Program and to have services provided to me by pharmacists working in collaboration with my provider.    I understand  the pharmacist within the [facility] may initiate, modify or discontinue my medications, order appropriate testing and appointments, perform exams, monitor treatment, and make clinical evaluations and decisions pursuant to a collaborative practice agreement with my provider.  I understand the pharmacist within the [facility] is not a physician, osteopathic physician, advanced practice registered nurse or physician assistant and may not diagnose.  I will take all my medications as instructed and not change the way I take it without first talking to my provider or a pharmacist within the [facility].  I understand that if I am late to my appointment I may not be able to be seen by a pharmacist at that time and will have to reschedule my appointment.  During appointment with pharmacist I understand that pharmacist has the right not to answer questions or perform services outside the pharmacist’s scope of practice.  By signing below, I provide informed consent for the pharmacist to provide these services and for my participation in the [facility].      Jose Null           [id number]          01/08/25  Patient Name                   MRN number  Date     ____Obtained verbal consent from Jose Null____  Patient Signature

## 2025-01-09 LAB — GAD65 AB SER IA-ACNC: <5 IU/ML (ref 0–5)

## 2025-04-09 ENCOUNTER — OFFICE VISIT (OUTPATIENT)
Dept: ENDOCRINOLOGY | Facility: MEDICAL CENTER | Age: 55
End: 2025-04-09
Attending: INTERNAL MEDICINE

## 2025-04-09 VITALS — WEIGHT: 219 LBS | BODY MASS INDEX: 28.89 KG/M2

## 2025-04-09 DIAGNOSIS — E11.65 TYPE 2 DIABETES MELLITUS WITH HYPERGLYCEMIA, WITHOUT LONG-TERM CURRENT USE OF INSULIN (HCC): Primary | ICD-10-CM

## 2025-04-09 LAB
HBA1C MFR BLD: 7.5 % (ref ?–5.8)
POCT INT CON NEG: NEGATIVE
POCT INT CON POS: POSITIVE

## 2025-04-09 PROCEDURE — 99212 OFFICE O/P EST SF 10 MIN: CPT

## 2025-04-09 PROCEDURE — 83036 HEMOGLOBIN GLYCOSYLATED A1C: CPT

## 2025-04-09 ASSESSMENT — FIBROSIS 4 INDEX: FIB4 SCORE: 1.44

## 2025-04-09 NOTE — PROGRESS NOTES
Patient Consult Note    Primary care physician: ARTUR Palencia    Reason for Consult: Management of Uncontrolled Type 2 Diabetes    Date Referral Placed: 10/01/24    HPI:  Jose Null is a 54 y.o. old patient who comes in today for evaluation of above stated problem.    Allergies  Patient has no known allergies.    Current Diabetes Medication Regimen   Metformin IR: 1000 mg BID    Previous Diabetes Medications and Reason for Discontinuation  None    Potential Barriers to Care:   Adherence: admits to 3-4 missed doses per week  Side effects: none  Affordability: no issues    SMBG  Pt has home glucometer and proper testing technique - sporadically checks before and after meals  Occassional: 110-120 mg/dL    Hypoglycemia  Hypoglycemia awareness: Yes  Nocturnal hypoglycemia: None  Hypoglycemia:  None  Pt's treatment of Hypoglycemia  Discussed 15:15 Rule    Lifestyle  Current Exercise - 10 mins a day of cardio/strength-training    Dietary  Breakfast - eggs, beans  Lunch - eats out typically; chicken, beans, tortilla  Dinner - sandwich  Snacks - none  Drinks - water    Labs     Labs from outside facility viewable in media tab:  MOUNA Ab: <5.0 (1/7/2025)    Lab Results   Component Value Date/Time    HBA1C 7.5 (A) 04/09/2025 11:30 AM    HBA1C 6.9 (A) 01/08/2025 10:25 AM    HBA1C 12.4 (A) 10/01/2024 09:04 AM      Lab Results   Component Value Date/Time    SODIUM 143 03/06/2024 03:22 PM    POTASSIUM 4.7 03/06/2024 03:22 PM    CHLORIDE 107 03/06/2024 03:22 PM    CO2 22 03/06/2024 03:22 PM    GLUCOSE 150 (H) 03/06/2024 03:22 PM    BUN 13 03/06/2024 03:22 PM    CREATININE 1.26 03/06/2024 03:22 PM     Lab Results   Component Value Date/Time    ALKPHOSPHAT 121 (H) 03/06/2024 03:22 PM    ASTSGOT 33 03/06/2024 03:22 PM    ALTSGPT 54 (H) 03/06/2024 03:22 PM    TBILIRUBIN 0.4 03/06/2024 03:22 PM    INR 1.03 07/26/2021 12:15 AM    ALBUMIN 4.3 03/06/2024 03:22 PM      Lab Results   Component Value Date/Time     CHOLSTRLTOT 204 (H) 03/10/2021 09:24 AM     (H) 03/10/2021 09:24 AM    HDL 56 03/10/2021 09:24 AM    TRIGLYCERIDE 105 03/10/2021 09:24 AM       Lab Results   Component Value Date/Time    MALBCRT 8 08/24/2022 04:07 PM    MICROALBUR 1.2 08/24/2022 04:07 PM       Physical Examination:  Vital signs: Wt 99.3 kg (219 lb)   BMI 28.89 kg/m²  Body mass index is 28.89 kg/m².    Assessment and Plan:    1. DM2  Patient presents to follow up for management of T2DM. Patient reports feeling well and is tolerating his metformin. He admits to missing 3-4 doses of metformin each week. He reports recent weight gain and worsened diet. Offered changing to metformin ER once daily to improve adherence, but patient declines. Counseled patient on different strategies to improve adherence.  Discussed Goals: FBG 80 - 130, 2hPP < 180, a1c < 7.0%  Last a1c drawn today was 7.5%, which is not at goal and has worsened since the previous reading of 6.9%.  Patient reports BG within range, but sporadically checks at different times of day.  Offered additional medications to lower A1c, but the patient is very resistant to making any changes to his regimen at this time. He would like to focus on lifestyle changes.    - Medication changes:  None    - Continue:  Metformin IR 1000 mg twice daily    - Lifestyle changes:  Exercise Goal - increase as tolerated, aim for 150 mins per week of moderate intensity exercise  Dietary Goal - limit carb intake    - Preventative management:  REC DM Score: 2  Care gaps addressed:   Lipid panel is due: Will call Your Main Lab for results  Microalbumin:creatine is due: Will call Your Main Lab for results  Care gaps updated in Health Maintenance    Follow Up:  3 months for repeat A1c     Letitia Page, Pharmacy Intern    CC:   ARTUR Palencia MD

## 2025-07-07 ENCOUNTER — OFFICE VISIT (OUTPATIENT)
Dept: ENDOCRINOLOGY | Facility: MEDICAL CENTER | Age: 55
End: 2025-07-07
Attending: INTERNAL MEDICINE

## 2025-07-07 VITALS — BODY MASS INDEX: 25.86 KG/M2 | WEIGHT: 196 LBS

## 2025-07-07 DIAGNOSIS — E11.65 TYPE 2 DIABETES MELLITUS WITH HYPERGLYCEMIA, WITHOUT LONG-TERM CURRENT USE OF INSULIN (HCC): Primary | ICD-10-CM

## 2025-07-07 LAB
HBA1C MFR BLD: 11.2 % (ref ?–5.8)
POCT INT CON NEG: NEGATIVE
POCT INT CON POS: POSITIVE

## 2025-07-07 PROCEDURE — 99212 OFFICE O/P EST SF 10 MIN: CPT

## 2025-07-07 PROCEDURE — 83036 HEMOGLOBIN GLYCOSYLATED A1C: CPT

## 2025-07-07 ASSESSMENT — FIBROSIS 4 INDEX: FIB4 SCORE: 1.44

## 2025-07-07 NOTE — PROGRESS NOTES
Patient Consult Note    Primary care physician: ARTUR Palencia    Reason for Consult: Management of Uncontrolled Type 2 Diabetes    Date Referral Placed: 10/01/24    HPI:  Jose Null is a 54 y.o. old patient who comes in today for evaluation of above stated problem.    Allergies  Patient has no known allergies.    Current Diabetes Medication Regimen   Metformin ER: 1000 mg BID    Previous Diabetes Medications and Reason for Discontinuation  None    Potential Barriers to Care:   Adherence: taking metformin  mg TID but admits to missing 2-3 PM doses per week  Side effects: none  Affordability: no issues    SMBG  Pt has home glucometer and proper testing technique - not checking    Hyperglycemia/Hypoglycemia  Hyperglycemia: polydipsia  Hypoglycemia awareness: Yes  Nocturnal hypoglycemia: None  Hypoglycemia:  None  Pt's treatment of Hypoglycemia  Discussed 15:15 Rule    Lifestyle  Current Exercise - 10 mins a day of cardio/strength-training    Dietary  Breakfast - eggs, tortilla, coffee with sugar  Lunch - eats out typically; chicken, beans, tortilla  Dinner - sandwich, beans, eggs  Snacks - occasional pastries (cakes and other desserts)  Drinks - water, soda (pt willing to eliminate)    Labs   Labs from outside facility viewable in media tab:  MOUNA Ab: <5.0 (1/7/2025)    Lab Results   Component Value Date/Time    HBA1C 11.2 (A) 07/07/2025 10:06 AM    HBA1C 7.5 (A) 04/09/2025 11:30 AM    HBA1C 6.9 (A) 01/08/2025 10:25 AM      Lab Results   Component Value Date/Time    SODIUM 143 03/06/2024 03:22 PM    POTASSIUM 4.7 03/06/2024 03:22 PM    CHLORIDE 107 03/06/2024 03:22 PM    CO2 22 03/06/2024 03:22 PM    GLUCOSE 150 (H) 03/06/2024 03:22 PM    BUN 13 03/06/2024 03:22 PM    CREATININE 1.26 03/06/2024 03:22 PM     Lab Results   Component Value Date/Time    ALKPHOSPHAT 121 (H) 03/06/2024 03:22 PM    ASTSGOT 33 03/06/2024 03:22 PM    ALTSGPT 54 (H) 03/06/2024 03:22 PM    TBILIRUBIN 0.4 03/06/2024  03:22 PM    INR 1.03 07/26/2021 12:15 AM    ALBUMIN 4.3 03/06/2024 03:22 PM      Lab Results   Component Value Date/Time    CHOLSTRLTOT 204 (H) 03/10/2021 09:24 AM     (H) 03/10/2021 09:24 AM    HDL 56 03/10/2021 09:24 AM    TRIGLYCERIDE 105 03/10/2021 09:24 AM       Lab Results   Component Value Date/Time    MALBCRT 8 08/24/2022 04:07 PM    MICROALBUR 1.2 08/24/2022 04:07 PM       Physical Examination:  Vital signs: Wt 88.9 kg (196 lb)   BMI 25.86 kg/m²  Body mass index is 25.86 kg/m².    Assessment and Plan:    1. DM2  Discussed Goals: FBG 80 - 130, 2hPP < 180, a1c < 7.0%  Last a1c drawn today was 11.2%, which is not at goal and has worsened since the previous reading of 7.5%.  Given A1c > 10% and pt with sx of hyperglycemia, recommended initiation of insulin therapy as this would be the most effective and would have the quickest effect in providing glycemic control. Pt declined this recommendation.  Offered additional non-insulin medications to lower A1c, but the patient is very resistant to making any changes to his regimen at this time. He would like to focus on lifestyle changes and eliminate consumption of sodas and desserts (increase in intake in the last 2 months).  Pt is taking lower than instructed doses of metformin. Counseled on proper administration.    - Medication changes:  None per pt preference    - Continue:  Metformin ER 1000 mg twice daily    - Lifestyle changes:  Exercise Goal - increase as tolerated, aim for 150 mins per week of moderate intensity exercise  Dietary Goal - limit carb intake (eliminate soda and limit desserts), practice plate method    - Preventative management:  REC DM Score: 2  Care gaps addressed:   Lipid panel is due: Will call Your [facility] for results  Microalbumin:creatine is due: Will call Your [facility] for results  Care gaps updated in Health Maintenance    Follow Up:  3 months for repeat A1c     Angelo Castellano, MarkusD    CC:   Tosin Shepherd,  ARTUR Vargas MD